# Patient Record
Sex: MALE | Race: WHITE | NOT HISPANIC OR LATINO | Employment: OTHER | URBAN - METROPOLITAN AREA
[De-identification: names, ages, dates, MRNs, and addresses within clinical notes are randomized per-mention and may not be internally consistent; named-entity substitution may affect disease eponyms.]

---

## 2023-02-28 ENCOUNTER — OFFICE VISIT (OUTPATIENT)
Dept: FAMILY MEDICINE | Facility: CLINIC | Age: 53
End: 2023-02-28
Payer: MEDICARE

## 2023-02-28 VITALS
RESPIRATION RATE: 18 BRPM | TEMPERATURE: 98 F | OXYGEN SATURATION: 97 % | SYSTOLIC BLOOD PRESSURE: 150 MMHG | HEART RATE: 89 BPM | DIASTOLIC BLOOD PRESSURE: 100 MMHG | WEIGHT: 286.25 LBS

## 2023-02-28 DIAGNOSIS — M54.9 BACK PAIN, UNSPECIFIED BACK LOCATION, UNSPECIFIED BACK PAIN LATERALITY, UNSPECIFIED CHRONICITY: ICD-10-CM

## 2023-02-28 DIAGNOSIS — M10.9 GOUT, UNSPECIFIED CAUSE, UNSPECIFIED CHRONICITY, UNSPECIFIED SITE: ICD-10-CM

## 2023-02-28 DIAGNOSIS — E55.9 VITAMIN D DEFICIENCY: ICD-10-CM

## 2023-02-28 DIAGNOSIS — Z00.00 WELLNESS EXAMINATION: ICD-10-CM

## 2023-02-28 DIAGNOSIS — Z12.5 SCREENING FOR PROSTATE CANCER: ICD-10-CM

## 2023-02-28 DIAGNOSIS — E29.1 HYPOGONADISM IN MALE: ICD-10-CM

## 2023-02-28 DIAGNOSIS — I10 HYPERTENSION, UNSPECIFIED TYPE: Primary | ICD-10-CM

## 2023-02-28 PROCEDURE — 99203 OFFICE O/P NEW LOW 30 MIN: CPT | Mod: 25,S$GLB,, | Performed by: PHYSICIAN ASSISTANT

## 2023-02-28 PROCEDURE — 99203 PR OFFICE/OUTPT VISIT, NEW, LEVL III, 30-44 MIN: ICD-10-PCS | Mod: 25,S$GLB,, | Performed by: PHYSICIAN ASSISTANT

## 2023-02-28 PROCEDURE — 96372 PR INJECTION,THERAP/PROPH/DIAG2ST, IM OR SUBCUT: ICD-10-PCS | Mod: S$GLB,,, | Performed by: PHYSICIAN ASSISTANT

## 2023-02-28 PROCEDURE — 96372 THER/PROPH/DIAG INJ SC/IM: CPT | Mod: S$GLB,,, | Performed by: PHYSICIAN ASSISTANT

## 2023-02-28 RX ORDER — TESTOSTERONE CYPIONATE 200 MG/ML
200 INJECTION, SOLUTION INTRAMUSCULAR
Qty: 10 ML | Refills: 1 | Status: SHIPPED | OUTPATIENT
Start: 2023-02-28 | End: 2024-02-22 | Stop reason: SDUPTHER

## 2023-02-28 RX ORDER — KETOROLAC TROMETHAMINE 30 MG/ML
60 INJECTION, SOLUTION INTRAMUSCULAR; INTRAVENOUS ONCE
Status: COMPLETED | OUTPATIENT
Start: 2023-02-28 | End: 2023-02-28

## 2023-02-28 RX ORDER — DULOXETIN HYDROCHLORIDE 30 MG/1
30 CAPSULE, DELAYED RELEASE ORAL DAILY
Qty: 90 CAPSULE | Refills: 1 | Status: SHIPPED | OUTPATIENT
Start: 2023-02-28 | End: 2023-06-19

## 2023-02-28 RX ORDER — KETOROLAC TROMETHAMINE 30 MG/ML
60 INJECTION, SOLUTION INTRAMUSCULAR; INTRAVENOUS
Status: DISCONTINUED | OUTPATIENT
Start: 2023-02-28 | End: 2023-02-28

## 2023-02-28 RX ORDER — LISINOPRIL 20 MG/1
20 TABLET ORAL DAILY
Qty: 90 TABLET | Refills: 3 | Status: SHIPPED | OUTPATIENT
Start: 2023-02-28 | End: 2023-06-19 | Stop reason: SDUPTHER

## 2023-02-28 RX ADMIN — KETOROLAC TROMETHAMINE 60 MG: 30 INJECTION, SOLUTION INTRAMUSCULAR; INTRAVENOUS at 03:02

## 2023-02-28 NOTE — PROGRESS NOTES
Subjective:       Patient ID: Bryant Corona is a 52 y.o. male.    Chief Complaint: Establish Care    Patient is a 53 yo male who is here to establish care today. He is a slight difficult historian.     Patient Active Problem List:     Hypertension uncontrolled     Hypogonadism in male has been out of his medication     Back pain request narcotic refill that he was getting from Dr Rojas      Vitamin D deficiency     Gout       Review of Systems   Constitutional:  Negative for activity change, chills, fatigue and fever.   HENT: Negative.     Eyes: Negative.    Respiratory:  Negative for chest tightness and shortness of breath.    Cardiovascular:  Negative for chest pain, palpitations and leg swelling.   Gastrointestinal:  Negative for abdominal pain.   Integumentary:  Negative.       Objective:      Physical Exam  Vitals reviewed.   Constitutional:       General: He is not in acute distress.     Appearance: Normal appearance. He is obese. He is not ill-appearing, toxic-appearing or diaphoretic.   Eyes:      Conjunctiva/sclera: Conjunctivae normal.   Cardiovascular:      Rate and Rhythm: Normal rate and regular rhythm.      Pulses: Normal pulses.      Heart sounds: Normal heart sounds. No murmur heard.    No friction rub. No gallop.   Pulmonary:      Effort: Pulmonary effort is normal. No respiratory distress.      Breath sounds: Normal breath sounds. No stridor. No wheezing, rhonchi or rales.   Chest:      Chest wall: No tenderness.   Abdominal:      General: There is distension.      Tenderness: There is no abdominal tenderness.   Musculoskeletal:         General: No swelling or tenderness.   Skin:     General: Skin is warm and dry.      Coloration: Skin is not jaundiced.   Neurological:      Mental Status: He is alert.   Psychiatric:         Mood and Affect: Mood normal.         Behavior: Behavior normal.         Thought Content: Thought content normal.         Judgment: Judgment normal.       Assessment:        Problem List Items Addressed This Visit       Vitamin D deficiency    Relevant Orders    Calcitriol    Hypogonadism in male    Relevant Orders    TESTOSTERONE    Hypertension - Primary    Relevant Orders    Ambulatory referral/consult to Pain Clinic    Comprehensive Metabolic Panel    TSH    T4, Free    CBC Auto Differential    Lipid Panel    Gout    Relevant Orders    Uric Acid    Back pain    Relevant Medications    ketorolac injection 60 mg     Other Visit Diagnoses       Wellness examination        Relevant Orders    HIV 1/2 Ag/Ab (4th Gen)    Hepatitis C Antibody    Screening for prostate cancer        Relevant Orders    PSA, Screening              Plan:       Hypertension, unspecified type  -     Ambulatory referral/consult to Pain Clinic; Future; Expected date: 03/07/2023  -     Comprehensive Metabolic Panel; Future; Expected date: 02/28/2023  -     TSH; Future; Expected date: 02/28/2023  -     T4, Free; Future; Expected date: 02/28/2023  -     CBC Auto Differential; Future; Expected date: 02/28/2023  -     Lipid Panel; Future; Expected date: 02/28/2023    Wellness examination  -     HIV 1/2 Ag/Ab (4th Gen); Future; Expected date: 02/28/2023  -     Hepatitis C Antibody; Future; Expected date: 02/28/2023    Hypogonadism in male  -     TESTOSTERONE; Future; Expected date: 02/28/2023    Screening for prostate cancer  -     PSA, Screening; Future; Expected date: 02/28/2023    Back pain, unspecified back location, unspecified back pain laterality, unspecified chronicity  -     Discontinue: ketorolac injection 60 mg    Gout, unspecified cause, unspecified chronicity, unspecified site  -     Uric Acid; Future; Expected date: 02/28/2023    Vitamin D deficiency  -     Calcitriol; Future; Expected date: 02/28/2023    Other orders  -     lisinopriL (PRINIVIL,ZESTRIL) 20 MG tablet; Take 1 tablet (20 mg total) by mouth once daily.  Dispense: 90 tablet; Refill: 3  -     DULoxetine (CYMBALTA) 30 MG capsule; Take 1 capsule  (30 mg total) by mouth once daily.  Dispense: 90 capsule; Refill: 1  -     testosterone cypionate (DEPO-TESTOSTERONE) 200 mg/mL injection; Inject 1 mL (200 mg total) into the muscle every 14 (fourteen) days.  Dispense: 10 mL; Refill: 1  -     ketorolac injection 60 mg           I spent 30 minutes on this encounter, time includes face-to-face, chart review, documentation, test review and orders.

## 2023-03-01 ENCOUNTER — LAB VISIT (OUTPATIENT)
Dept: FAMILY MEDICINE | Facility: CLINIC | Age: 53
End: 2023-03-01
Payer: MEDICARE

## 2023-03-01 DIAGNOSIS — Z00.00 WELLNESS EXAMINATION: ICD-10-CM

## 2023-03-01 DIAGNOSIS — E29.1 HYPOGONADISM IN MALE: ICD-10-CM

## 2023-03-01 DIAGNOSIS — Z12.5 SCREENING FOR PROSTATE CANCER: ICD-10-CM

## 2023-03-01 DIAGNOSIS — M10.9 GOUT, UNSPECIFIED CAUSE, UNSPECIFIED CHRONICITY, UNSPECIFIED SITE: ICD-10-CM

## 2023-03-01 DIAGNOSIS — I10 HYPERTENSION, UNSPECIFIED TYPE: ICD-10-CM

## 2023-03-01 LAB
ALBUMIN SERPL BCP-MCNC: 3.7 G/DL (ref 3.5–5.2)
ALP SERPL-CCNC: 83 U/L (ref 55–135)
ALT SERPL W/O P-5'-P-CCNC: 31 U/L (ref 10–44)
ANION GAP SERPL CALC-SCNC: 6 MMOL/L (ref 8–16)
AST SERPL-CCNC: 27 U/L (ref 10–40)
BASOPHILS # BLD AUTO: 0.06 K/UL (ref 0–0.2)
BASOPHILS NFR BLD: 0.6 % (ref 0–1.9)
BILIRUB SERPL-MCNC: 0.6 MG/DL (ref 0.1–1)
BUN SERPL-MCNC: 11 MG/DL (ref 6–20)
CALCIUM SERPL-MCNC: 9.2 MG/DL (ref 8.7–10.5)
CHLORIDE SERPL-SCNC: 105 MMOL/L (ref 95–110)
CHOLEST SERPL-MCNC: 184 MG/DL (ref 120–199)
CHOLEST/HDLC SERPL: 5.3 {RATIO} (ref 2–5)
CO2 SERPL-SCNC: 28 MMOL/L (ref 23–29)
CREAT SERPL-MCNC: 0.9 MG/DL (ref 0.5–1.4)
DIFFERENTIAL METHOD: ABNORMAL
EOSINOPHIL # BLD AUTO: 0.4 K/UL (ref 0–0.5)
EOSINOPHIL NFR BLD: 3.9 % (ref 0–8)
ERYTHROCYTE [DISTWIDTH] IN BLOOD BY AUTOMATED COUNT: 13.5 % (ref 11.5–14.5)
EST. GFR  (NO RACE VARIABLE): >60 ML/MIN/1.73 M^2
GLUCOSE SERPL-MCNC: 101 MG/DL (ref 70–110)
HCT VFR BLD AUTO: 48.5 % (ref 40–54)
HDLC SERPL-MCNC: 35 MG/DL (ref 40–75)
HDLC SERPL: 19 % (ref 20–50)
HGB BLD-MCNC: 16.1 G/DL (ref 14–18)
IMM GRANULOCYTES # BLD AUTO: 0.05 K/UL (ref 0–0.04)
IMM GRANULOCYTES NFR BLD AUTO: 0.5 % (ref 0–0.5)
LDLC SERPL CALC-MCNC: 116.4 MG/DL (ref 63–159)
LYMPHOCYTES # BLD AUTO: 2.9 K/UL (ref 1–4.8)
LYMPHOCYTES NFR BLD: 29.9 % (ref 18–48)
MCH RBC QN AUTO: 30.7 PG (ref 27–31)
MCHC RBC AUTO-ENTMCNC: 33.2 G/DL (ref 32–36)
MCV RBC AUTO: 92 FL (ref 82–98)
MONOCYTES # BLD AUTO: 0.6 K/UL (ref 0.3–1)
MONOCYTES NFR BLD: 6 % (ref 4–15)
NEUTROPHILS # BLD AUTO: 5.8 K/UL (ref 1.8–7.7)
NEUTROPHILS NFR BLD: 59.1 % (ref 38–73)
NONHDLC SERPL-MCNC: 149 MG/DL
NRBC BLD-RTO: 0 /100 WBC
PLATELET # BLD AUTO: 305 K/UL (ref 150–450)
PMV BLD AUTO: 9.8 FL (ref 9.2–12.9)
POTASSIUM SERPL-SCNC: 4.5 MMOL/L (ref 3.5–5.1)
PROT SERPL-MCNC: 6.5 G/DL (ref 6–8.4)
RBC # BLD AUTO: 5.25 M/UL (ref 4.6–6.2)
SODIUM SERPL-SCNC: 139 MMOL/L (ref 136–145)
TRIGL SERPL-MCNC: 163 MG/DL (ref 30–150)
URATE SERPL-MCNC: 8.9 MG/DL (ref 3.4–7)
WBC # BLD AUTO: 9.79 K/UL (ref 3.9–12.7)

## 2023-03-01 PROCEDURE — 84443 ASSAY THYROID STIM HORMONE: CPT | Performed by: PHYSICIAN ASSISTANT

## 2023-03-01 PROCEDURE — 36415 COLL VENOUS BLD VENIPUNCTURE: CPT | Performed by: PHYSICIAN ASSISTANT

## 2023-03-01 PROCEDURE — 85025 COMPLETE CBC W/AUTO DIFF WBC: CPT | Performed by: PHYSICIAN ASSISTANT

## 2023-03-01 PROCEDURE — 80053 COMPREHEN METABOLIC PANEL: CPT | Performed by: PHYSICIAN ASSISTANT

## 2023-03-01 PROCEDURE — 87389 HIV-1 AG W/HIV-1&-2 AB AG IA: CPT | Performed by: PHYSICIAN ASSISTANT

## 2023-03-01 PROCEDURE — 86803 HEPATITIS C AB TEST: CPT | Performed by: PHYSICIAN ASSISTANT

## 2023-03-01 PROCEDURE — 84403 ASSAY OF TOTAL TESTOSTERONE: CPT | Performed by: PHYSICIAN ASSISTANT

## 2023-03-01 PROCEDURE — 84153 ASSAY OF PSA TOTAL: CPT | Performed by: PHYSICIAN ASSISTANT

## 2023-03-01 PROCEDURE — 84439 ASSAY OF FREE THYROXINE: CPT | Performed by: PHYSICIAN ASSISTANT

## 2023-03-01 PROCEDURE — 80061 LIPID PANEL: CPT | Performed by: PHYSICIAN ASSISTANT

## 2023-03-01 PROCEDURE — 84550 ASSAY OF BLOOD/URIC ACID: CPT | Performed by: PHYSICIAN ASSISTANT

## 2023-03-02 LAB
COMPLEXED PSA SERPL-MCNC: 2.5 NG/ML (ref 0–4)
HCV AB SERPL QL IA: NORMAL
HIV 1+2 AB+HIV1 P24 AG SERPL QL IA: NORMAL
T4 FREE SERPL-MCNC: 0.79 NG/DL (ref 0.71–1.51)
TESTOST SERPL-MCNC: 299 NG/DL (ref 304–1227)
TSH SERPL DL<=0.005 MIU/L-ACNC: 1.61 UIU/ML (ref 0.4–4)

## 2023-06-08 ENCOUNTER — TELEPHONE (OUTPATIENT)
Dept: PRIMARY CARE CLINIC | Facility: CLINIC | Age: 53
End: 2023-06-08

## 2023-06-08 RX ORDER — INDOMETHACIN 50 MG/1
50 CAPSULE ORAL 3 TIMES DAILY PRN
Qty: 20 CAPSULE | Refills: 0 | Status: SHIPPED | OUTPATIENT
Start: 2023-06-08 | End: 2023-06-13 | Stop reason: SDUPTHER

## 2023-06-08 NOTE — TELEPHONE ENCOUNTER
----- Message from Paul Liz sent at 6/8/2023  9:32 AM CDT -----  Regarding: refill  Contact: EDMAR LEUNG [[id number]]  Type:  RX Refill Request    Who Called:  Mellissa, wife    Refill or New Rx:  refill    RX Name and Strength:  Allopurinol    How is the patient currently taking it? (ex. 1XDay):  See chart    Is this a 30 day or 90 day RX:  see chart    Preferred Pharmacy with phone number:    [facility]/pharmacy #[id number] [address] [address]  Phone: [phone] Fax: [phone]      Local or Mail Order:  Local    Ordering Provider:  OTTONIEL Graham    [facility] Call Back Number:  [phone]    Additional Information:  Did not see Rx on chart. Please call to advise.

## 2023-06-13 ENCOUNTER — TELEPHONE (OUTPATIENT)
Dept: PRIMARY CARE CLINIC | Facility: CLINIC | Age: 53
End: 2023-06-13
Payer: MEDICARE

## 2023-06-13 RX ORDER — INDOMETHACIN 50 MG/1
50 CAPSULE ORAL 3 TIMES DAILY PRN
Qty: 30 CAPSULE | Refills: 1 | Status: SHIPPED | OUTPATIENT
Start: 2023-06-13

## 2023-06-13 NOTE — TELEPHONE ENCOUNTER
Patient states he is having a bad gout flare up and could not even go to work.     He has an appt for Monday but is asking if something could be sent in to [facility] in the meantime.

## 2023-06-13 NOTE — TELEPHONE ENCOUNTER
----- Message from Johanna Evans sent at 6/13/2023  2:26 PM CDT -----  Regarding: Sooner Appointment Request  Contact: EDMAR LEUNG [phone]  Type:  Sooner Appointment Request      Patient is requesting a sooner appointment.  Patient declined first available appointment listed below.  Patient will not accept being placed on the waitlist and is requesting a message be sent to doctor.      Name of Caller:  EDMAR LEUNG    When is the first available appointment? 06-20-23    Symptoms:  Gout (R) Hand / Swollen     Best Call Back Number:  [phone] (home)       Additional Information:  Patient is calling office to speak with nurse/MA to schedule sooner appointment. Patient complains of severe pain.  Please call back and advise. Thanks

## 2023-06-19 ENCOUNTER — OFFICE VISIT (OUTPATIENT)
Dept: PRIMARY CARE CLINIC | Facility: CLINIC | Age: 53
End: 2023-06-19
Payer: MEDICARE

## 2023-06-19 VITALS
HEART RATE: 98 BPM | WEIGHT: 274.69 LBS | DIASTOLIC BLOOD PRESSURE: 98 MMHG | OXYGEN SATURATION: 97 % | SYSTOLIC BLOOD PRESSURE: 146 MMHG

## 2023-06-19 DIAGNOSIS — M10.9 GOUT, UNSPECIFIED CAUSE, UNSPECIFIED CHRONICITY, UNSPECIFIED SITE: ICD-10-CM

## 2023-06-19 DIAGNOSIS — I10 HYPERTENSION, UNSPECIFIED TYPE: Primary | ICD-10-CM

## 2023-06-19 PROCEDURE — 99214 OFFICE O/P EST MOD 30 MIN: CPT | Mod: S$GLB,,, | Performed by: PHYSICIAN ASSISTANT

## 2023-06-19 PROCEDURE — 99214 PR OFFICE/OUTPT VISIT, EST, LEVL IV, 30-39 MIN: ICD-10-PCS | Mod: S$GLB,,, | Performed by: PHYSICIAN ASSISTANT

## 2023-06-19 RX ORDER — FLUOXETINE HYDROCHLORIDE 40 MG/1
40 CAPSULE ORAL DAILY
Qty: 90 CAPSULE | Refills: 3 | Status: SHIPPED | OUTPATIENT
Start: 2023-06-19 | End: 2024-02-22 | Stop reason: SDUPTHER

## 2023-06-19 RX ORDER — LISINOPRIL 20 MG/1
20 TABLET ORAL DAILY
Qty: 90 TABLET | Refills: 3 | Status: SHIPPED | OUTPATIENT
Start: 2023-06-19 | End: 2023-10-16

## 2023-06-19 RX ORDER — ALLOPURINOL 300 MG/1
300 TABLET ORAL DAILY
Qty: 90 TABLET | Refills: 3 | Status: SHIPPED | OUTPATIENT
Start: 2023-06-19 | End: 2024-02-26 | Stop reason: SDUPTHER

## 2023-06-19 RX ORDER — FLUOXETINE HYDROCHLORIDE 40 MG/1
40 CAPSULE ORAL DAILY
COMMUNITY
End: 2023-06-19 | Stop reason: SDUPTHER

## 2023-06-19 NOTE — PROGRESS NOTES
Subjective     Patient ID: Bryant Corona is a 52 y.o. male.    Chief Complaint: Gout and Joint Pain (Fluid on elbow)    Elbow Injury  This is a new problem. The current episode started 1 to 4 weeks ago. The problem occurs constantly. The problem has been gradually improving. Associated symptoms include arthralgias, a fever and joint swelling. Pertinent negatives include no abdominal pain, chest pain, chills, fatigue or nausea. Associated symptoms comments: Red tender right elbow. . Nothing aggravates the symptoms. Treatments tried: was recently given indomethasin. The treatment provided moderate relief.   Review of Systems   Constitutional:  Positive for fever. Negative for activity change, chills and fatigue.   Respiratory:  Negative for chest tightness, shortness of breath and wheezing.    Cardiovascular:  Negative for chest pain, palpitations and leg swelling.   Gastrointestinal:  Negative for abdominal pain, diarrhea and nausea.   Musculoskeletal:  Positive for arthralgias and joint swelling.   Past Medical History:   Diagnosis Date    Anxiety     Back pain     Depression     Hypertension           Objective     Physical Exam  Vitals reviewed.   Constitutional:       General: He is not in acute distress.     Appearance: Normal appearance. He is not ill-appearing, toxic-appearing or diaphoretic.   Cardiovascular:      Rate and Rhythm: Normal rate and regular rhythm.      Pulses: Normal pulses.      Heart sounds: Normal heart sounds. No murmur heard.    No friction rub. No gallop.   Pulmonary:      Effort: Pulmonary effort is normal. No respiratory distress.      Breath sounds: Normal breath sounds. No stridor. No wheezing, rhonchi or rales.   Chest:      Chest wall: No tenderness.   Abdominal:      Palpations: Abdomen is soft.      Tenderness: There is no abdominal tenderness.   Musculoskeletal:      Right elbow: Swelling present. Tenderness present in olecranon process.   Neurological:      Mental Status: He is  alert.          Assessment and Plan     1. Hypertension, unspecified type    2. Gout, unspecified cause, unspecified chronicity, unspecified site    Other orders  -     lisinopriL (PRINIVIL,ZESTRIL) 20 MG tablet; Take 1 tablet (20 mg total) by mouth once daily.  Dispense: 90 tablet; Refill: 3  -     FLUoxetine 40 MG capsule; Take 1 capsule (40 mg total) by mouth once daily.  Dispense: 90 capsule; Refill: 3  -     allopurinoL (ZYLOPRIM) 300 MG tablet; Take 1 tablet (300 mg total) by mouth once daily.  Dispense: 90 tablet; Refill: 3        I spent 30 minutes on this encounter, time includes face-to-face, chart review, documentation, test review and orders.           Follow up in about 4 weeks (around 7/17/2023).

## 2023-09-26 ENCOUNTER — TELEPHONE (OUTPATIENT)
Dept: PRIMARY CARE CLINIC | Facility: CLINIC | Age: 53
End: 2023-09-26
Payer: MEDICARE

## 2023-09-26 NOTE — TELEPHONE ENCOUNTER
Pt called stating he was having chest pains upon returning pt call he then stated that he was depressed and feeling anxious. Reports taking Prozac. States that he has been in the bed for several days and doesn't have any energy to do anything. Pt denies having a fever, body aches, pain but states that he has had a few nights of night sweats. Encouraged pt to go to the ER but pt refused and scheduled next opening in clinic with DENA Chester. educated pt on coming in and being evaluated and to have his medications reviewed to be sure that they were effective. Understanding voiced. Pt was very emotional during call. Also spoke with his wife who was also emotional. Will follow up with pt prior to scheduled appointment time

## 2023-10-16 ENCOUNTER — OFFICE VISIT (OUTPATIENT)
Dept: PRIMARY CARE CLINIC | Facility: CLINIC | Age: 53
End: 2023-10-16
Payer: MEDICARE

## 2023-10-16 VITALS
WEIGHT: 269.63 LBS | OXYGEN SATURATION: 99 % | HEART RATE: 83 BPM | SYSTOLIC BLOOD PRESSURE: 138 MMHG | BODY MASS INDEX: 37.75 KG/M2 | HEIGHT: 71 IN | DIASTOLIC BLOOD PRESSURE: 102 MMHG

## 2023-10-16 DIAGNOSIS — I15.2 OBESITY, DIABETES, AND HYPERTENSION SYNDROME: ICD-10-CM

## 2023-10-16 DIAGNOSIS — E11.69 OBESITY, DIABETES, AND HYPERTENSION SYNDROME: ICD-10-CM

## 2023-10-16 DIAGNOSIS — G47.30 SLEEP APNEA, UNSPECIFIED TYPE: ICD-10-CM

## 2023-10-16 DIAGNOSIS — E66.9 OBESITY, DIABETES, AND HYPERTENSION SYNDROME: ICD-10-CM

## 2023-10-16 DIAGNOSIS — I10 HYPERTENSION, UNSPECIFIED TYPE: Primary | ICD-10-CM

## 2023-10-16 DIAGNOSIS — F19.11 HISTORY OF SUBSTANCE ABUSE: ICD-10-CM

## 2023-10-16 DIAGNOSIS — E66.01 SEVERE OBESITY (BMI 35.0-39.9) WITH COMORBIDITY: ICD-10-CM

## 2023-10-16 DIAGNOSIS — R53.83 FATIGUE, UNSPECIFIED TYPE: ICD-10-CM

## 2023-10-16 DIAGNOSIS — E11.59 OBESITY, DIABETES, AND HYPERTENSION SYNDROME: ICD-10-CM

## 2023-10-16 DIAGNOSIS — R73.03 PRE-DIABETES: ICD-10-CM

## 2023-10-16 DIAGNOSIS — E29.1 HYPOGONADISM IN MALE: ICD-10-CM

## 2023-10-16 DIAGNOSIS — E78.00 ELEVATED CHOLESTEROL: ICD-10-CM

## 2023-10-16 LAB
ALBUMIN SERPL BCP-MCNC: 3.8 G/DL (ref 3.5–5.2)
ALP SERPL-CCNC: 81 U/L (ref 55–135)
ALT SERPL W/O P-5'-P-CCNC: 13 U/L (ref 10–44)
ANION GAP SERPL CALC-SCNC: 10 MMOL/L (ref 8–16)
AST SERPL-CCNC: 11 U/L (ref 10–40)
BILIRUB SERPL-MCNC: 0.8 MG/DL (ref 0.1–1)
BUN SERPL-MCNC: 15 MG/DL (ref 6–20)
CALCIUM SERPL-MCNC: 9.5 MG/DL (ref 8.7–10.5)
CHLORIDE SERPL-SCNC: 105 MMOL/L (ref 95–110)
CHOLEST SERPL-MCNC: 184 MG/DL (ref 120–199)
CHOLEST/HDLC SERPL: 6.1 {RATIO} (ref 2–5)
CO2 SERPL-SCNC: 23 MMOL/L (ref 23–29)
CREAT SERPL-MCNC: 1.2 MG/DL (ref 0.5–1.4)
EST. GFR  (NO RACE VARIABLE): >60 ML/MIN/1.73 M^2
ESTIMATED AVG GLUCOSE: 120 MG/DL (ref 68–131)
GLUCOSE SERPL-MCNC: 93 MG/DL (ref 70–110)
HBA1C MFR BLD: 5.8 % (ref 4–5.6)
HDLC SERPL-MCNC: 30 MG/DL (ref 40–75)
HDLC SERPL: 16.3 % (ref 20–50)
LDLC SERPL CALC-MCNC: 119.2 MG/DL (ref 63–159)
NONHDLC SERPL-MCNC: 154 MG/DL
POTASSIUM SERPL-SCNC: 4.6 MMOL/L (ref 3.5–5.1)
PROT SERPL-MCNC: 7.1 G/DL (ref 6–8.4)
SODIUM SERPL-SCNC: 138 MMOL/L (ref 136–145)
T3FREE SERPL-MCNC: 2.5 PG/ML (ref 2.3–4.2)
T4 FREE SERPL-MCNC: 0.94 NG/DL (ref 0.71–1.51)
TESTOST SERPL-MCNC: 307 NG/DL (ref 304–1227)
TRIGL SERPL-MCNC: 174 MG/DL (ref 30–150)
TSH SERPL DL<=0.005 MIU/L-ACNC: 1.57 UIU/ML (ref 0.4–4)

## 2023-10-16 PROCEDURE — 99214 PR OFFICE/OUTPT VISIT, EST, LEVL IV, 30-39 MIN: ICD-10-PCS | Mod: S$GLB,,, | Performed by: PHYSICIAN ASSISTANT

## 2023-10-16 PROCEDURE — 84403 ASSAY OF TOTAL TESTOSTERONE: CPT | Performed by: PHYSICIAN ASSISTANT

## 2023-10-16 PROCEDURE — 84443 ASSAY THYROID STIM HORMONE: CPT | Performed by: PHYSICIAN ASSISTANT

## 2023-10-16 PROCEDURE — 83036 HEMOGLOBIN GLYCOSYLATED A1C: CPT | Performed by: PHYSICIAN ASSISTANT

## 2023-10-16 PROCEDURE — 80061 LIPID PANEL: CPT | Performed by: PHYSICIAN ASSISTANT

## 2023-10-16 PROCEDURE — 84481 FREE ASSAY (FT-3): CPT | Performed by: PHYSICIAN ASSISTANT

## 2023-10-16 PROCEDURE — 84439 ASSAY OF FREE THYROXINE: CPT | Performed by: PHYSICIAN ASSISTANT

## 2023-10-16 PROCEDURE — 99214 OFFICE O/P EST MOD 30 MIN: CPT | Mod: S$GLB,,, | Performed by: PHYSICIAN ASSISTANT

## 2023-10-16 PROCEDURE — 80053 COMPREHEN METABOLIC PANEL: CPT | Performed by: PHYSICIAN ASSISTANT

## 2023-10-16 RX ORDER — LISINOPRIL 40 MG/1
40 TABLET ORAL DAILY
Qty: 90 TABLET | Refills: 3 | Status: SHIPPED | OUTPATIENT
Start: 2023-10-16 | End: 2023-11-29 | Stop reason: CLARIF

## 2023-10-16 NOTE — PROGRESS NOTES
Subjective     Patient ID: Bryant Corona is a 53 y.o. male.    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 He was let go from the job. This happened 1 month ago. He sleeps all day, He feels like he is going to pass out when he tries to do even minimal activity.     Fatigue  This is a recurrent problem. The current episode started more than 1 month ago. The problem occurs constantly. The problem has been unchanged. Associated symptoms include fatigue and weakness. Pertinent negatives include no abdominal pain, chest pain, chills, coughing or headaches. Exacerbated by: uncontroled sleep apnea. He has tried nothing for the symptoms.     Patient Active Problem List   Diagnosis    Hypertension    Hypogonadism in male    Back pain    Vitamin D deficiency    Gout    Sleep apnea    Obesity, diabetes, and hypertension syndrome    Elevated cholesterol    Pre-diabetes    Severe obesity (BMI 35.0-39.9) with comorbidity    History of substance abuse       Review of Systems   Constitutional:  Positive for activity change and fatigue. Negative for chills.   HENT: Negative.     Eyes: Negative.    Respiratory:  Positive for chest tightness and shortness of breath. Negative for apnea, cough, choking, wheezing and stridor.    Cardiovascular:  Negative for chest pain.   Gastrointestinal:  Negative for abdominal pain.   Endocrine: Negative.    Genitourinary: Negative.    Musculoskeletal:  Positive for back pain.   Integumentary:  Negative.   Neurological:  Positive for weakness. Negative for dizziness, seizures, syncope, light-headedness and headaches.   Psychiatric/Behavioral:  Positive for dysphoric mood. Negative for suicidal ideas. The patient is not nervous/anxious.           Objective     Physical Exam  Vitals reviewed.   Constitutional:       General: He is not in acute distress.     Appearance: Normal  appearance. He is obese. He is not ill-appearing, toxic-appearing or diaphoretic.   Neck:      Vascular: No carotid bruit.   Cardiovascular:      Rate and Rhythm: Normal rate and regular rhythm.      Pulses: Normal pulses.      Heart sounds: Normal heart sounds. No murmur heard.     No friction rub. No gallop.   Pulmonary:      Effort: Pulmonary effort is normal. No respiratory distress.      Breath sounds: Normal breath sounds. No stridor. No wheezing, rhonchi or rales.   Chest:      Chest wall: No tenderness.   Abdominal:      General: There is distension.      Tenderness: There is no abdominal tenderness.   Musculoskeletal:         General: No swelling or tenderness.      Cervical back: No rigidity or tenderness.   Lymphadenopathy:      Cervical: No cervical adenopathy.   Neurological:      Mental Status: He is alert.            Assessment and Plan     1. Hypertension, unspecified type  -  increase to    lisinopriL (PRINIVIL,ZESTRIL) 40 MG tablet; Take 1 tablet (40 mg total) by mouth once daily.  Dispense: 90 tablet; Refill: 3  -     Comprehensive Metabolic Panel; Future; Expected date: 10/16/2023    2. Hypogonadism in male  -     TESTOSTERONE; Future; Expected date: 10/16/2023    3. Pre-diabetes  -     Hemoglobin A1C; Future; Expected date: 10/16/2023  -     Ambulatory referral/consult to Diabetes Education; Future; Expected date: 10/23/2023    4. Fatigue, unspecified type  -     TSH; Future; Expected date: 10/16/2023  -     T3, Free; Future; Expected date: 10/16/2023  -     T4, Free; Future; Expected date: 10/16/2023  -     Ambulatory referral/consult to Cardiology; Future; Expected date: 10/23/2023 to r/o cardiac cause    5. Elevated cholesterol  -     Lipid Panel; Future; Expected date: 10/16/2023    6. Obesity, diabetes, and hypertension syndrome  -     Ambulatory referral/consult to Diabetes Education; Future; Expected date: 10/23/2023    7. Sleep apnea, unspecified type  Patient has not been using his  machine. He will start using it.     8. Severe obesity (BMI 35.0-39.9) with comorbidity  The patient is asked to make an attempt to improve diet and exercise patterns to aid in medical management of this problem.      9. History of substance abuse  Has been in remission                  Follow up in about 4 weeks (around 11/13/2023) for with me.

## 2023-10-17 ENCOUNTER — TELEPHONE (OUTPATIENT)
Dept: PRIMARY CARE CLINIC | Facility: CLINIC | Age: 53
End: 2023-10-17
Payer: MEDICARE

## 2023-10-17 NOTE — TELEPHONE ENCOUNTER
----- Message from William Graham III, PA-C sent at 10/17/2023  7:19 AM CDT -----  Bryant Corona    The lab shows that you are in the prediabetic range. I recommend a diet low in concentrated sweets and weight loss needed.

## 2023-10-18 ENCOUNTER — OFFICE VISIT (OUTPATIENT)
Dept: CARDIOLOGY | Facility: CLINIC | Age: 53
End: 2023-10-18
Payer: MEDICARE

## 2023-10-18 VITALS
WEIGHT: 272.5 LBS | HEIGHT: 71 IN | BODY MASS INDEX: 38.15 KG/M2 | DIASTOLIC BLOOD PRESSURE: 78 MMHG | HEART RATE: 78 BPM | SYSTOLIC BLOOD PRESSURE: 128 MMHG

## 2023-10-18 DIAGNOSIS — R94.31 NONSPECIFIC ABNORMAL ELECTROCARDIOGRAM (ECG) (EKG): ICD-10-CM

## 2023-10-18 DIAGNOSIS — E66.01 SEVERE OBESITY (BMI 35.0-39.9) WITH COMORBIDITY: Chronic | ICD-10-CM

## 2023-10-18 DIAGNOSIS — E66.9 OBESITY, DIABETES, AND HYPERTENSION SYNDROME: Chronic | ICD-10-CM

## 2023-10-18 DIAGNOSIS — R06.02 SOB (SHORTNESS OF BREATH): Primary | ICD-10-CM

## 2023-10-18 DIAGNOSIS — R07.2 PRECORDIAL PAIN: Chronic | ICD-10-CM

## 2023-10-18 DIAGNOSIS — E11.69 OBESITY, DIABETES, AND HYPERTENSION SYNDROME: Chronic | ICD-10-CM

## 2023-10-18 DIAGNOSIS — I15.2 OBESITY, DIABETES, AND HYPERTENSION SYNDROME: Chronic | ICD-10-CM

## 2023-10-18 DIAGNOSIS — E11.59 OBESITY, DIABETES, AND HYPERTENSION SYNDROME: Chronic | ICD-10-CM

## 2023-10-18 DIAGNOSIS — Z72.0 TOBACCO DIPPER: Chronic | ICD-10-CM

## 2023-10-18 DIAGNOSIS — R53.83 FATIGUE, UNSPECIFIED TYPE: Chronic | ICD-10-CM

## 2023-10-18 DIAGNOSIS — G47.33 OSA ON CPAP: Chronic | ICD-10-CM

## 2023-10-18 DIAGNOSIS — R55 NEAR SYNCOPE: ICD-10-CM

## 2023-10-18 DIAGNOSIS — E78.00 ELEVATED CHOLESTEROL: Chronic | ICD-10-CM

## 2023-10-18 PROCEDURE — 93000 ELECTROCARDIOGRAM COMPLETE: CPT | Mod: ,,, | Performed by: INTERNAL MEDICINE

## 2023-10-18 PROCEDURE — 99204 OFFICE O/P NEW MOD 45 MIN: CPT | Mod: 25,S$GLB,, | Performed by: INTERNAL MEDICINE

## 2023-10-18 PROCEDURE — 93000 EKG 12-LEAD: ICD-10-PCS | Mod: ,,, | Performed by: INTERNAL MEDICINE

## 2023-10-18 PROCEDURE — 99204 PR OFFICE/OUTPT VISIT, NEW, LEVL IV, 45-59 MIN: ICD-10-PCS | Mod: 25,S$GLB,, | Performed by: INTERNAL MEDICINE

## 2023-10-18 RX ORDER — PRAVASTATIN SODIUM 20 MG/1
20 TABLET ORAL DAILY
Qty: 90 TABLET | Refills: 0 | Status: SHIPPED | OUTPATIENT
Start: 2023-10-18 | End: 2024-01-18
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
## 2023-10-25 DIAGNOSIS — E11.9 TYPE 2 DIABETES MELLITUS WITHOUT COMPLICATION: ICD-10-CM

## 2023-11-02 ENCOUNTER — CLINICAL SUPPORT (OUTPATIENT)
Dept: CARDIOLOGY | Facility: CLINIC | Age: 53
End: 2023-11-02
Attending: INTERNAL MEDICINE
Payer: MEDICARE

## 2023-11-02 VITALS — WEIGHT: 272 LBS | HEIGHT: 71 IN | BODY MASS INDEX: 38.08 KG/M2

## 2023-11-02 DIAGNOSIS — R55 NEAR SYNCOPE: ICD-10-CM

## 2023-11-02 LAB
ASCENDING AORTA: 2.94 CM
AV INDEX (PROSTH): 0.82
AV MEAN GRADIENT: 3 MMHG
AV PEAK GRADIENT: 6 MMHG
AV VALVE AREA BY VELOCITY RATIO: 3.4 CM²
AV VALVE AREA: 3.32 CM²
AV VELOCITY RATIO: 0.84
BSA FOR ECHO PROCEDURE: 2.49 M2
CV ECHO LV RWT: 0.42 CM
DOP CALC AO PEAK VEL: 1.19 M/S
DOP CALC AO VTI: 23 CM
DOP CALC LVOT AREA: 4 CM2
DOP CALC LVOT DIAMETER: 2.27 CM
DOP CALC LVOT PEAK VEL: 1 M/S
DOP CALC LVOT STROKE VOLUME: 76.45 CM3
DOP CALCLVOT PEAK VEL VTI: 18.9 CM
E WAVE DECELERATION TIME: 127.27 MSEC
E/A RATIO: 0.9
E/E' RATIO: 9.78 M/S
ECHO LV POSTERIOR WALL: 1.09 CM (ref 0.6–1.1)
EJECTION FRACTION: 70 %
FRACTIONAL SHORTENING: 42 % (ref 28–44)
INTERVENTRICULAR SEPTUM: 1.25 CM (ref 0.6–1.1)
LEFT ARM DIASTOLIC BLOOD PRESSURE: 78 MMHG
LEFT ARM SYSTOLIC BLOOD PRESSURE: 128 MMHG
LEFT ATRIUM SIZE: 3.93 CM
LEFT ATRIUM VOLUME INDEX MOD: 35.2 ML/M2
LEFT ATRIUM VOLUME MOD: 84.59 CM3
LEFT CBA DIAS: 22 CM/S
LEFT CBA SYS: 65 CM/S
LEFT CCA DIST DIAS: 22 CM/S
LEFT CCA DIST SYS: 70 CM/S
LEFT CCA MID DIAS: 24 CM/S
LEFT CCA MID SYS: 79 CM/S
LEFT CCA PROX DIAS: 15 CM/S
LEFT CCA PROX SYS: 68 CM/S
LEFT ECA DIAS: 16 CM/S
LEFT ECA SYS: 107 CM/S
LEFT ICA DIST DIAS: 33 CM/S
LEFT ICA DIST SYS: 73 CM/S
LEFT ICA MID DIAS: 21 CM/S
LEFT ICA MID SYS: 60 CM/S
LEFT ICA PROX DIAS: 15 CM/S
LEFT ICA PROX SYS: 42 CM/S
LEFT INTERNAL DIMENSION IN SYSTOLE: 3.02 CM (ref 2.1–4)
LEFT VENTRICLE DIASTOLIC VOLUME INDEX: 53.66 ML/M2
LEFT VENTRICLE DIASTOLIC VOLUME: 128.78 ML
LEFT VENTRICLE MASS INDEX: 100 G/M2
LEFT VENTRICLE SYSTOLIC VOLUME INDEX: 14.8 ML/M2
LEFT VENTRICLE SYSTOLIC VOLUME: 35.62 ML
LEFT VENTRICULAR INTERNAL DIMENSION IN DIASTOLE: 5.19 CM (ref 3.5–6)
LEFT VENTRICULAR MASS: 239.52 G
LEFT VERTEBRAL DIAS: 21 CM/S
LEFT VERTEBRAL SYS: 46 CM/S
LV LATERAL E/E' RATIO: 8.8 M/S
LV SEPTAL E/E' RATIO: 11 M/S
LVOT MG: 2.02 MMHG
LVOT MV: 0.67 CM/S
MV PEAK A VEL: 0.98 M/S
MV PEAK E VEL: 0.88 M/S
MV STENOSIS PRESSURE HALF TIME: 36.91 MS
MV VALVE AREA P 1/2 METHOD: 5.96 CM2
OHS CV CAROTID RIGHT ICA EDV HIGHEST: 32
OHS CV CAROTID ULTRASOUND LEFT ICA/CCA RATIO: 1.04
OHS CV CAROTID ULTRASOUND RIGHT ICA/CCA RATIO: 0.78
OHS CV PV CAROTID LEFT HIGHEST CCA: 79
OHS CV PV CAROTID LEFT HIGHEST ICA: 73
OHS CV PV CAROTID RIGHT HIGHEST CCA: 93
OHS CV PV CAROTID RIGHT HIGHEST ICA: 73
OHS CV US CAROTID LEFT HIGHEST EDV: 33
PISA TR MAX VEL: 2.5 M/S
RA PRESSURE ESTIMATED: 3 MMHG
RIGHT ARM DIASTOLIC BLOOD PRESSURE: 78 MMHG
RIGHT ARM SYSTOLIC BLOOD PRESSURE: 128 MMHG
RIGHT CBA DIAS: 18 CM/S
RIGHT CBA SYS: 72 CM/S
RIGHT CCA DIST DIAS: 23 CM/S
RIGHT CCA DIST SYS: 93 CM/S
RIGHT CCA MID DIAS: 19 CM/S
RIGHT CCA MID SYS: 78 CM/S
RIGHT CCA PROX DIAS: 15 CM/S
RIGHT CCA PROX SYS: 78 CM/S
RIGHT ECA DIAS: 11 CM/S
RIGHT ECA SYS: 70 CM/S
RIGHT ICA DIST DIAS: 32 CM/S
RIGHT ICA DIST SYS: 73 CM/S
RIGHT ICA MID DIAS: 23 CM/S
RIGHT ICA MID SYS: 61 CM/S
RIGHT ICA PROX DIAS: 13 CM/S
RIGHT ICA PROX SYS: 38 CM/S
RIGHT VENTRICULAR END-DIASTOLIC DIMENSION: 3.81 CM
RIGHT VENTRICULAR LENGTH IN DIASTOLE (APICAL 4-CHAMBER VIEW): 6.93 CM
RIGHT VERTEBRAL DIAS: 19 CM/S
RIGHT VERTEBRAL SYS: 38 CM/S
RV MID DIAMA: 2.27 CM
RV TB RVSP: 6 MMHG
RV TISSUE DOPPLER FREE WALL SYSTOLIC VELOCITY 1 (APICAL 4 CHAMBER VIEW): 15.82 CM/S
SINUS: 3.22 CM
STJ: 3.23 CM
TDI LATERAL: 0.1 M/S
TDI SEPTAL: 0.08 M/S
TDI: 0.09 M/S
TR MAX PG: 25 MMHG
TRICUSPID ANNULAR PLANE SYSTOLIC EXCURSION: 2.29 CM
TV REST PULMONARY ARTERY PRESSURE: 28 MMHG
Z-SCORE OF LEFT VENTRICULAR DIMENSION IN END DIASTOLE: -7.45
Z-SCORE OF LEFT VENTRICULAR DIMENSION IN END SYSTOLE: -6.12

## 2023-11-02 PROCEDURE — 93880 EXTRACRANIAL BILAT STUDY: CPT | Mod: S$GLB,,, | Performed by: INTERNAL MEDICINE

## 2023-11-02 PROCEDURE — 93880 CV US DOPPLER CAROTID (CUPID ONLY): ICD-10-PCS | Mod: S$GLB,,, | Performed by: INTERNAL MEDICINE

## 2023-11-09 ENCOUNTER — CLINICAL SUPPORT (OUTPATIENT)
Dept: CARDIOLOGY | Facility: HOSPITAL | Age: 53
End: 2023-11-09
Attending: INTERNAL MEDICINE
Payer: MEDICARE

## 2023-11-09 ENCOUNTER — HOSPITAL ENCOUNTER (OUTPATIENT)
Dept: RADIOLOGY | Facility: HOSPITAL | Age: 53
Discharge: HOME | End: 2023-11-09
Attending: INTERNAL MEDICINE
Payer: MEDICARE

## 2023-11-09 VITALS — WEIGHT: 272 LBS | HEIGHT: 71 IN | BODY MASS INDEX: 38.08 KG/M2

## 2023-11-09 DIAGNOSIS — Z72.0 TOBACCO DIPPER: Chronic | ICD-10-CM

## 2023-11-09 DIAGNOSIS — R94.31 NONSPECIFIC ABNORMAL ELECTROCARDIOGRAM (ECG) (EKG): ICD-10-CM

## 2023-11-09 DIAGNOSIS — R06.02 SOB (SHORTNESS OF BREATH): ICD-10-CM

## 2023-11-09 DIAGNOSIS — R55 NEAR SYNCOPE: ICD-10-CM

## 2023-11-09 DIAGNOSIS — R07.2 PRECORDIAL PAIN: Chronic | ICD-10-CM

## 2023-11-09 LAB
CV PHARM DOSE: 0.4 MG
CV STRESS BASE HR: 82 BPM
DIASTOLIC BLOOD PRESSURE: 80 MMHG
LEFT ABI: 1.29
LEFT ARM BP: 133 MMHG
LEFT DORSALIS PEDIS: 162 MMHG
LEFT POSTERIOR TIBIAL: 171 MMHG
LEFT TBI: 0.86
LEFT TOE PRESSURE: 115 MMHG
NUC REST EJECTION FRACTION: 68
OHS CV CPX 1 MINUTE RECOVERY HEART RATE: 110 BPM
OHS CV CPX 85 PERCENT MAX PREDICTED HEART RATE MALE: 140
OHS CV CPX MAX PREDICTED HEART RATE: 165
OHS CV CPX PATIENT IS FEMALE: 0
OHS CV CPX PATIENT IS MALE: 1
OHS CV CPX PEAK DIASTOLIC BLOOD PRESSURE: 74 MMHG
OHS CV CPX PEAK HEAR RATE: 114 BPM
OHS CV CPX PEAK RATE PRESSURE PRODUCT: NORMAL
OHS CV CPX PEAK SYSTOLIC BLOOD PRESSURE: 129 MMHG
OHS CV CPX PERCENT MAX PREDICTED HEART RATE ACHIEVED: 69
OHS CV CPX RATE PRESSURE PRODUCT PRESENTING: 9594
OHS CV PHARM TIME: 1352 MIN
RIGHT ABI: 1.08
RIGHT ARM BP: 124 MMHG
RIGHT DORSALIS PEDIS: 144 MMHG
RIGHT POSTERIOR TIBIAL: 137 MMHG
RIGHT TBI: 0.89
RIGHT TOE PRESSURE: 119 MMHG
SYSTOLIC BLOOD PRESSURE: 117 MMHG

## 2023-11-09 PROCEDURE — A9502 TC99M TETROFOSMIN: HCPCS | Mod: PO

## 2023-11-09 PROCEDURE — 78452 NUCLEAR STRESS - CARDIOLOGY INTERPRETED (CUPID ONLY): ICD-10-PCS | Mod: 26,,, | Performed by: INTERNAL MEDICINE

## 2023-11-09 PROCEDURE — 78452 HT MUSCLE IMAGE SPECT MULT: CPT | Mod: 26,,, | Performed by: INTERNAL MEDICINE

## 2023-11-09 PROCEDURE — 93018 PR CARDIAC STRESS TST,INTERP/REPT ONLY: ICD-10-PCS | Mod: ,,, | Performed by: INTERNAL MEDICINE

## 2023-11-09 PROCEDURE — 93016 NUCLEAR STRESS - CARDIOLOGY INTERPRETED (CUPID ONLY): ICD-10-PCS | Mod: ,,, | Performed by: INTERNAL MEDICINE

## 2023-11-09 PROCEDURE — 93018 CV STRESS TEST I&R ONLY: CPT | Mod: ,,, | Performed by: INTERNAL MEDICINE

## 2023-11-09 PROCEDURE — 63600175 PHARM REV CODE 636 W HCPCS: Mod: PO | Performed by: INTERNAL MEDICINE

## 2023-11-09 PROCEDURE — 93244 HOLTER MONITOR - 72 HOUR: ICD-10-PCS | Mod: ,,, | Performed by: INTERNAL MEDICINE

## 2023-11-09 PROCEDURE — 93017 CV STRESS TEST TRACING ONLY: CPT | Mod: PO

## 2023-11-09 PROCEDURE — 93244 EXT ECG>48HR<7D REV&INTERPJ: CPT | Mod: ,,, | Performed by: INTERNAL MEDICINE

## 2023-11-09 PROCEDURE — 93922 UPR/L XTREMITY ART 2 LEVELS: CPT | Mod: 26,,, | Performed by: INTERNAL MEDICINE

## 2023-11-09 PROCEDURE — 93016 CV STRESS TEST SUPVJ ONLY: CPT | Mod: ,,, | Performed by: INTERNAL MEDICINE

## 2023-11-09 PROCEDURE — 93922 UPR/L XTREMITY ART 2 LEVELS: CPT | Mod: PO

## 2023-11-09 PROCEDURE — 93243 EXT ECG>48HR<7D SCAN A/R: CPT | Mod: PO

## 2023-11-09 PROCEDURE — 93922 ANKLE BRACHIAL INDICES (ABI): ICD-10-PCS | Mod: 26,,, | Performed by: INTERNAL MEDICINE

## 2023-11-09 RX ORDER — REGADENOSON 0.08 MG/ML
0.4 INJECTION, SOLUTION INTRAVENOUS ONCE
Status: COMPLETED | OUTPATIENT
Start: 2023-11-09 | End: 2023-11-09

## 2023-11-09 RX ADMIN — REGADENOSON 0.4 MG: 0.08 INJECTION, SOLUTION INTRAVENOUS at 01:11

## 2023-11-15 ENCOUNTER — OFFICE VISIT (OUTPATIENT)
Dept: CARDIOLOGY | Facility: CLINIC | Age: 53
End: 2023-11-15
Payer: MEDICARE

## 2023-11-15 VITALS
HEART RATE: 86 BPM | WEIGHT: 276.44 LBS | BODY MASS INDEX: 37.44 KG/M2 | DIASTOLIC BLOOD PRESSURE: 71 MMHG | HEIGHT: 72 IN | SYSTOLIC BLOOD PRESSURE: 127 MMHG

## 2023-11-15 DIAGNOSIS — E66.01 SEVERE OBESITY (BMI 35.0-39.9) WITH COMORBIDITY: ICD-10-CM

## 2023-11-15 DIAGNOSIS — I10 PRIMARY HYPERTENSION: Chronic | ICD-10-CM

## 2023-11-15 DIAGNOSIS — R07.2 PRECORDIAL PAIN: ICD-10-CM

## 2023-11-15 DIAGNOSIS — G47.33 OSA ON CPAP: Chronic | ICD-10-CM

## 2023-11-15 DIAGNOSIS — R06.02 SOB (SHORTNESS OF BREATH): Primary | ICD-10-CM

## 2023-11-15 DIAGNOSIS — Z72.0 TOBACCO DIPPER: Chronic | ICD-10-CM

## 2023-11-15 DIAGNOSIS — R59.1 LYMPHADENOPATHY OF HEAD AND NECK: ICD-10-CM

## 2023-11-15 DIAGNOSIS — I34.0 MODERATE MITRAL REGURGITATION: ICD-10-CM

## 2023-11-15 DIAGNOSIS — R94.39 ABNORMAL NUCLEAR STRESS TEST: ICD-10-CM

## 2023-11-15 DIAGNOSIS — R73.03 PRE-DIABETES: Chronic | ICD-10-CM

## 2023-11-15 PROCEDURE — 99215 PR OFFICE/OUTPT VISIT, EST, LEVL V, 40-54 MIN: ICD-10-PCS | Mod: S$GLB,,, | Performed by: INTERNAL MEDICINE

## 2023-11-15 PROCEDURE — 99215 OFFICE O/P EST HI 40 MIN: CPT | Mod: S$GLB,,, | Performed by: INTERNAL MEDICINE

## 2023-11-15 RX ORDER — NAPROXEN SODIUM 220 MG/1
81 TABLET, FILM COATED ORAL ONCE
Status: CANCELLED | OUTPATIENT
Start: 2023-11-15 | End: 2023-11-15

## 2023-11-15 RX ORDER — SODIUM CHLORIDE 9 MG/ML
INJECTION, SOLUTION INTRAVENOUS ONCE
Status: CANCELLED | OUTPATIENT
Start: 2023-11-15 | End: 2023-11-15

## 2023-11-15 RX ORDER — CLOPIDOGREL BISULFATE 75 MG/1
300 TABLET ORAL ONCE
Status: CANCELLED | OUTPATIENT
Start: 2023-11-15 | End: 2023-11-15

## 2023-11-15 NOTE — PROGRESS NOTES
Subjective:    Patient ID:  Bryant Corona is a 53 y.o. male who presents for Shortness of Breath        Shortness of Breath  Associated symptoms include chest pain (TO BACK,) and neck pain. Pertinent negatives include no abdominal pain, claudication, hemoptysis, leg swelling (RLE), orthopnea, PND, syncope or wheezing.       DISCUSSED TESTS ABNORMAL NUCLEAR, MOD MR, NORMAL CAROTIDS, INCIDENTAL FINDING OF A LYMPH NODE, NORMAL SHERIF, MULTIPLE ACHES, MULTIPLE ACCIDENTS IN THE PAST, SEE ROS  Past Medical History:   Diagnosis Date    Anxiety     Back pain     Depression     Hypertension      Past Surgical History:   Procedure Laterality Date    TIBIA FRACTURE SURGERY Right      No family history on file.  Social History     Socioeconomic History    Marital status:    Tobacco Use    Smoking status: Never    Smokeless tobacco: Never       Review of patient's allergies indicates:   Allergen Reactions    Codeine        Current Outpatient Medications:     allopurinoL (ZYLOPRIM) 300 MG tablet, Take 1 tablet (300 mg total) by mouth once daily., Disp: 90 tablet, Rfl: 3    FLUoxetine 40 MG capsule, Take 1 capsule (40 mg total) by mouth once daily., Disp: 90 capsule, Rfl: 3    indomethacin (INDOCIN) 50 MG capsule, Take 1 capsule (50 mg total) by mouth 3 (three) times daily as needed (acute gout pain)., Disp: 30 capsule, Rfl: 1    lisinopriL (PRINIVIL,ZESTRIL) 40 MG tablet, Take 1 tablet (40 mg total) by mouth once daily., Disp: 90 tablet, Rfl: 3    pravastatin (PRAVACHOL) 20 MG tablet, Take 1 tablet (20 mg total) by mouth once daily., Disp: 90 tablet, Rfl: 0    testosterone cypionate (DEPO-TESTOSTERONE) 200 mg/mL injection, Inject 1 mL (200 mg total) into the muscle every 14 (fourteen) days., Disp: 10 mL, Rfl: 1    Review of Systems   Constitutional: Positive for malaise/fatigue. Negative for chills, diaphoresis, night sweats and weight loss.   HENT:  Negative for congestion and nosebleeds.    Eyes:  Negative for blurred  "vision and visual disturbance.   Cardiovascular:  Positive for chest pain (TO BACK,), dyspnea on exertion and near-syncope (GOES TO LIE DOWN). Negative for claudication, cyanosis, irregular heartbeat, leg swelling (RLE), orthopnea, palpitations, paroxysmal nocturnal dyspnea and syncope.   Respiratory:  Positive for shortness of breath. Negative for cough, hemoptysis and wheezing.    Endocrine: Negative for cold intolerance, heat intolerance and polyuria.   Hematologic/Lymphatic: Negative for adenopathy. Does not bruise/bleed easily.   Skin:  Negative for color change, itching and nail changes.   Musculoskeletal:  Positive for arthritis, back pain, joint pain and neck pain. Negative for falls.   Gastrointestinal:  Negative for abdominal pain, change in bowel habit, dysphagia, jaundice, melena and nausea.   Genitourinary:  Negative for dysuria and flank pain.        TESTICULAR MASS THAT HE HAD FOR YEARS   Neurological:  Positive for dizziness. Negative for brief paralysis, focal weakness, light-headedness, loss of balance and weakness. Numbness: R HAND. Paresthesias: R FOOT.  Psychiatric/Behavioral:  Negative for altered mental status and depression. The patient is not nervous/anxious.    Allergic/Immunologic: Negative for persistent infections.        Objective:      Vitals:    11/15/23 1354   BP: 127/71   Pulse: 86   Weight: 125.4 kg (276 lb 7.3 oz)   Height: 5' 11.5" (1.816 m)     Body mass index is 38.02 kg/m².    Physical Exam  Constitutional:       Appearance: Normal appearance. He is morbidly obese.   Neck:      Vascular: Normal carotid pulses. No hepatojugular reflux or JVD.   Cardiovascular:      Rate and Rhythm: No extrasystoles are present.     Pulses:           Carotid pulses are 2+ on the right side and 2+ on the left side.       Radial pulses are 2+ on the right side and 2+ on the left side.        Femoral pulses are 2+ on the right side and 2+ on the left side.       Posterior tibial pulses are 2+ on " the right side and 2+ on the left side.      Heart sounds: Murmur heard.      Systolic murmur is present with a grade of 2/6 at the lower left sternal border.      No friction rub. No S4 sounds.   Pulmonary:      Effort: Pulmonary effort is normal. No respiratory distress.      Breath sounds: Normal breath sounds and air entry.   Musculoskeletal:      Cervical back: Neck supple.      Right lower leg: No edema.      Left lower leg: No edema.      Comments: SLOW GAIT   Neurological:      Mental Status: He is alert and oriented to person, place, and time.      Cranial Nerves: Cranial nerves 2-12 are intact.   Psychiatric:         Mood and Affect: Mood normal.         Speech: Speech normal.         Behavior: Behavior normal.                 ..    Chemistry        Component Value Date/Time     10/16/2023 0845    K 4.6 10/16/2023 0845     10/16/2023 0845    CO2 23 10/16/2023 0845    BUN 15 10/16/2023 0845    CREATININE 1.2 10/16/2023 0845    GLU 93 10/16/2023 0845        Component Value Date/Time    CALCIUM 9.5 10/16/2023 0845    ALKPHOS 81 10/16/2023 0845    AST 11 10/16/2023 0845    ALT 13 10/16/2023 0845    BILITOT 0.8 10/16/2023 0845            ..  Lab Results   Component Value Date    CHOL 184 10/16/2023    CHOL 176 03/11/2023    CHOL 184 03/01/2023     Lab Results   Component Value Date    HDL 30 (L) 10/16/2023    HDL 31 (L) 03/11/2023    HDL 35 (L) 03/01/2023     Lab Results   Component Value Date    LDLCALC 119.2 10/16/2023    LDLCALC 110 03/11/2023    LDLCALC 116.4 03/01/2023     Lab Results   Component Value Date    TRIG 174 (H) 10/16/2023    TRIG 176 (H) 03/11/2023    TRIG 163 (H) 03/01/2023     Lab Results   Component Value Date    CHOLHDL 16.3 (L) 10/16/2023    CHOLHDL 19.0 (L) 03/01/2023     ..  Lab Results   Component Value Date    WBC 9.79 03/01/2023    HGB 16.1 03/01/2023    HCT 48.5 03/01/2023    MCV 92 03/01/2023     03/01/2023       Test(s) Reviewed  I have reviewed the following in  detail:  [x] Stress test   [] Angiography   [x] Echocardiogram   [] Labs   [x] Other:       Assessment:         ICD-10-CM ICD-9-CM   1. SOB (shortness of breath)  R06.02 786.05   2. Abnormal nuclear stress test  R94.39 794.39   3. Moderate mitral regurgitation  I34.0 424.0   4.   Z72.0 305.1   5. LOLA on CPAP  G47.33 327.23   6. Severe obesity (BMI 35.0-39.9) with comorbidity  E66.01 278.01   7. Precordial pain  R07.2 786.51   8. Primary hypertension  I10 401.9   9. Pre-diabetes  R73.03 790.29   10. Lymphadenopathy of head and neck  R59.1 785.6     Problem List Items Addressed This Visit          Pulmonary    SOB (shortness of breath) - Primary    Relevant Orders    Case Request-Cath Lab: Angiogram, Coronary, with Left Heart Cath (Completed)    Vital signs    Cardiac Monitoring - Adult    Basic metabolic panel    Full code       Cardiac/Vascular    Hypertension    Precordial pain    Relevant Orders    Case Request-Cath Lab: Angiogram, Coronary, with Left Heart Cath (Completed)    Vital signs    Cardiac Monitoring - Adult    Basic metabolic panel    Full code    Abnormal nuclear stress test    Relevant Orders    Case Request-Cath Lab: Angiogram, Coronary, with Left Heart Cath (Completed)    Vital signs    Cardiac Monitoring - Adult    Basic metabolic panel    Full code    Moderate mitral regurgitation    Relevant Orders    Vital signs    Cardiac Monitoring - Adult    Basic metabolic panel    Full code       Endocrine    Pre-diabetes    Severe obesity (BMI 35.0-39.9) with comorbidity       Other    LOLA on CPAP        Lymphadenopathy of head and neck    Relevant Orders    US Soft Tissue Head Neck Thyroid (Completed)        Plan:         ABNORMAL NUCLEAR STRESS TEST MODERATE RISK WITH CONTINUED SYMPTOMS ON MEDICAL TREATMENT , MODERATE MITRAL REGURGITATION, DISCUSSED OPTIONS AT LENGTH WILL PROCEED WITH ANGIOGRAPHY RISK AND ALTERNATIVE PAIN PATIENT DETAILS INFORMED CONSENT WAS OBTAINED, WILL  OBTAIN ALSO ULTRASOUND OF THE NECK FOR LYMPHADENOPATHY NEEDS TO FOLLOW-UP WITH PCP MIGHT NEEDS FURTHER EVALUATION RULE OUT LYMPHOMA ETCETERA, CLASS 2 ANGINA NO OVERT HEART FAILURE NO TIA TYPE SYMPTOMS , DIET EXERCISE WEIGHT LOSS, TOBACCO CESSATION ALL QUESTIONS ANSWERED  SOB (shortness of breath)  -     Case Request-Cath Lab: Angiogram, Coronary, with Left Heart Cath; Standing  -     Establish IV access and maintain saline lock; Standing  -     Hold Warfarin; Standing  -     Hold Enoxaparin/subcutaneous Heparin; Standing  -     Hold Heparin drip; Standing  -     Height and weight; Standing  -     Verify informed consent; Standing  -     Vital signs; Standing  -     Cardiac Monitoring - Adult; Standing  -     Pulse Oximetry Q4H; Standing  -     Diet NPO; Standing  -     CBC auto differential; Standing  -     Basic metabolic panel; Standing  -     Full code; Standing    Abnormal nuclear stress test  -     Case Request-Cath Lab: Angiogram, Coronary, with Left Heart Cath; Standing  -     Establish IV access and maintain saline lock; Standing  -     Hold Warfarin; Standing  -     Hold Enoxaparin/subcutaneous Heparin; Standing  -     Hold Heparin drip; Standing  -     Height and weight; Standing  -     Verify informed consent; Standing  -     Vital signs; Standing  -     Cardiac Monitoring - Adult; Standing  -     Pulse Oximetry Q4H; Standing  -     Diet NPO; Standing  -     CBC auto differential; Standing  -     Basic metabolic panel; Standing  -     Full code; Standing    Moderate mitral regurgitation  -     Establish IV access and maintain saline lock; Standing  -     Hold Warfarin; Standing  -     Hold Enoxaparin/subcutaneous Heparin; Standing  -     Hold Heparin drip; Standing  -     Height and weight; Standing  -     Verify informed consent; Standing  -     Vital signs; Standing  -     Cardiac Monitoring - Adult; Standing  -     Pulse Oximetry Q4H; Standing  -     Diet NPO; Standing  -     CBC auto differential;  Standing  -     Basic metabolic panel; Standing  -     Full code; Standing        LOLA on CPAP    Severe obesity (BMI 35.0-39.9) with comorbidity    Precordial pain  -     Case Request-Cath Lab: Angiogram, Coronary, with Left Heart Cath; Standing  -     Establish IV access and maintain saline lock; Standing  -     Hold Warfarin; Standing  -     Hold Enoxaparin/subcutaneous Heparin; Standing  -     Hold Heparin drip; Standing  -     Height and weight; Standing  -     Verify informed consent; Standing  -     Vital signs; Standing  -     Cardiac Monitoring - Adult; Standing  -     Pulse Oximetry Q4H; Standing  -     Diet NPO; Standing  -     CBC auto differential; Standing  -     Basic metabolic panel; Standing  -     Full code; Standing    Primary hypertension    Pre-diabetes    Lymphadenopathy of head and neck  -     US Soft Tissue Head Neck Thyroid; Future; Expected date: 11/15/2023    Other orders  -     0.9%  NaCl infusion  -     aspirin chewable tablet 81 mg  -     clopidogreL tablet 300 mg    RTC Low level/low impact aerobic exercise 5x's/wk. Heart healthy diet and risk factor modification.    See labs and med orders.    Aerobic exercise 5x's/wk. Heart healthy diet and risk factor modification.    See labs and med orders.

## 2023-11-16 ENCOUNTER — HOSPITAL ENCOUNTER (OUTPATIENT)
Dept: RADIOLOGY | Facility: HOSPITAL | Age: 53
Discharge: HOME | End: 2023-11-16
Attending: INTERNAL MEDICINE
Payer: MEDICARE

## 2023-11-16 DIAGNOSIS — R59.1 LYMPHADENOPATHY OF HEAD AND NECK: ICD-10-CM

## 2023-11-16 PROCEDURE — 76536 US EXAM OF HEAD AND NECK: CPT | Mod: TC,PO

## 2023-11-16 PROCEDURE — 76536 US SOFT TISSUE HEAD NECK THYROID: ICD-10-PCS | Mod: 26,,, | Performed by: RADIOLOGY

## 2023-11-16 PROCEDURE — 76536 US EXAM OF HEAD AND NECK: CPT | Mod: 26,,, | Performed by: RADIOLOGY

## 2023-11-17 ENCOUNTER — TELEPHONE (OUTPATIENT)
Dept: CARDIOLOGY | Facility: CLINIC | Age: 53
End: 2023-11-17
Payer: MEDICARE

## 2023-11-17 DIAGNOSIS — R59.1 LYMPHADENOPATHY OF HEAD AND NECK: Primary | ICD-10-CM

## 2023-11-21 LAB
OHS CV EVENT MONITOR DAY: 0
OHS CV HOLTER LENGTH DECIMAL HOURS: 72
OHS CV HOLTER LENGTH HOURS: 72
OHS CV HOLTER LENGTH MINUTES: 0
OHS CV HOLTER SINUS AVERAGE HR: 92
OHS CV HOLTER SINUS MAX HR: 145
OHS CV HOLTER SINUS MIN HR: 62

## 2023-11-28 ENCOUNTER — TELEPHONE (OUTPATIENT)
Dept: CARDIOLOGY | Facility: CLINIC | Age: 53
End: 2023-11-28
Payer: MEDICARE

## 2023-11-28 ENCOUNTER — TELEPHONE (OUTPATIENT)
Dept: PRIMARY CARE CLINIC | Facility: CLINIC | Age: 53
End: 2023-11-28
Payer: MEDICARE

## 2023-11-28 DIAGNOSIS — R59.1 LYMPHADENOPATHY: Primary | ICD-10-CM

## 2023-11-28 NOTE — TELEPHONE ENCOUNTER
Patient was found to have an abnormal lymph node seen on a carotid ultrasound. He needs follow up lab and a ENT consult. Orders have been placed.

## 2023-11-28 NOTE — TELEPHONE ENCOUNTER
----- Message from Ced Teran MD sent at 11/27/2023  6:39 PM CST -----  ABNORMAL LYMPH NODE ON THE CAROTID ULTRASOUND AND ON SOFT TISSUE NECK ULTRASOUND SEND REFERRAL TO ENT I DO NOT SEE AN APPOINTMENT PLEASE FOLLOW-UP ON THAT HE MIGHT NEED MORE WORKUP FOR POSSIBLE MALIGNANCY

## 2023-11-28 NOTE — TELEPHONE ENCOUNTER
----- Message from Laina Monroy sent at 11/28/2023  7:36 AM CST -----  Contact: Self  Type: Needs Medical Advice    Who Called:  Patient  What is this regarding?:  He is needing to reschedule his heart catherer surgery due to car troubles.  Best Call Back Number:  [phone]  Additional Information:  Please call the patient back at the phone number listed above to advise. Thank you!

## 2023-11-29 ENCOUNTER — OFFICE VISIT (OUTPATIENT)
Dept: CARDIOLOGY | Facility: CLINIC | Age: 53
End: 2023-11-29
Attending: INTERNAL MEDICINE
Payer: MEDICARE

## 2023-11-29 VITALS
DIASTOLIC BLOOD PRESSURE: 88 MMHG | HEIGHT: 71 IN | BODY MASS INDEX: 39.1 KG/M2 | SYSTOLIC BLOOD PRESSURE: 139 MMHG | WEIGHT: 279.31 LBS | HEART RATE: 90 BPM

## 2023-11-29 DIAGNOSIS — R59.1 LYMPHADENOPATHY OF HEAD AND NECK: Chronic | ICD-10-CM

## 2023-11-29 DIAGNOSIS — R07.2 PRECORDIAL PAIN: ICD-10-CM

## 2023-11-29 DIAGNOSIS — I10 PRIMARY HYPERTENSION: Primary | ICD-10-CM

## 2023-11-29 DIAGNOSIS — E66.01 SEVERE OBESITY (BMI 35.0-39.9) WITH COMORBIDITY: Chronic | ICD-10-CM

## 2023-11-29 DIAGNOSIS — Z72.0 TOBACCO DIPPER: Chronic | ICD-10-CM

## 2023-11-29 PROCEDURE — 99213 PR OFFICE/OUTPT VISIT, EST, LEVL III, 20-29 MIN: ICD-10-PCS | Mod: 25,S$GLB,, | Performed by: INTERNAL MEDICINE

## 2023-11-29 PROCEDURE — 93000 ELECTROCARDIOGRAM COMPLETE: CPT | Mod: ,,, | Performed by: INTERNAL MEDICINE

## 2023-11-29 PROCEDURE — 93000 EKG 12-LEAD: ICD-10-PCS | Mod: ,,, | Performed by: INTERNAL MEDICINE

## 2023-11-29 PROCEDURE — 99213 OFFICE O/P EST LOW 20 MIN: CPT | Mod: 25,S$GLB,, | Performed by: INTERNAL MEDICINE

## 2023-11-29 RX ORDER — LOSARTAN POTASSIUM 50 MG/1
50 TABLET ORAL DAILY
COMMUNITY
End: 2024-02-22 | Stop reason: SDUPTHER

## 2023-11-29 RX ORDER — ERGOCALCIFEROL 1.25 MG/1
50000 CAPSULE ORAL
COMMUNITY
End: 2024-02-22 | Stop reason: SDUPTHER

## 2023-11-29 RX ORDER — ALPRAZOLAM 0.5 MG/1
TABLET ORAL
COMMUNITY
End: 2024-02-22

## 2023-11-29 RX ORDER — HYDROCODONE BITARTRATE AND ACETAMINOPHEN 10; 325 MG/1; MG/1
1 TABLET ORAL 2 TIMES DAILY PRN
COMMUNITY
End: 2023-11-29

## 2023-11-29 RX ORDER — METOPROLOL SUCCINATE 25 MG/1
25 TABLET, EXTENDED RELEASE ORAL DAILY
Qty: 90 TABLET | Refills: 0 | Status: SHIPPED | OUTPATIENT
Start: 2023-11-29 | End: 2024-02-22

## 2023-11-29 NOTE — PROGRESS NOTES
Subjective:    Patient ID:  Bryant Corona is a 53 y.o. male who presents for SOB (shortness of breath) 10/18/2023 SOB (shortness of darren and Chest Pain        HPI  REQUESTED OFFICE VISIT, PATIENT CX CATH , SOCIAL TROUBLE, DID NOT WAKE UP, RE-SCHEDULED, STILL DIPPING, STRESS, CHRONIC PAIN, BP UP, HAS BEEN HAVING SOME CHEST PAIN, SEE REVIEW OF SYSTEMS    Past Medical History:   Diagnosis Date    Anxiety     Back pain     Depression     Gout, unspecified     Hyperlipidemia     Hypertension     Sleep apnea     uses cpap     Past Surgical History:   Procedure Laterality Date    left knee surgery      TIBIA FRACTURE SURGERY Right      No family history on file.  Social History     Socioeconomic History    Marital status:    Tobacco Use    Smoking status: Some Days     Types: Cigarettes    Smokeless tobacco: Never    Tobacco comments:     Very seldom   Substance and Sexual Activity    Alcohol use: Yes     Comment: seldom    Drug use: Never       Review of patient's allergies indicates:   Allergen Reactions    Codeine Itching       Current Outpatient Medications:     allopurinoL (ZYLOPRIM) 300 MG tablet, Take 1 tablet (300 mg total) by mouth once daily., Disp: 90 tablet, Rfl: 3    ergocalciferol (ERGOCALCIFEROL) 50,000 unit Cap, Take 50,000 Units by mouth every 7 days., Disp: , Rfl:     FLUoxetine 40 MG capsule, Take 1 capsule (40 mg total) by mouth once daily., Disp: 90 capsule, Rfl: 3    losartan (COZAAR) 50 MG tablet, Take 50 mg by mouth once daily., Disp: , Rfl:     pravastatin (PRAVACHOL) 20 MG tablet, Take 1 tablet (20 mg total) by mouth once daily., Disp: 90 tablet, Rfl: 0    testosterone cypionate (DEPO-TESTOSTERONE) 200 mg/mL injection, Inject 1 mL (200 mg total) into the muscle every 14 (fourteen) days., Disp: 10 mL, Rfl: 1    ALPRAZolam (XANAX) 0.5 MG tablet, TAKE 1 TABLET BY MOUTH EVERY 6 TO 8 HOURS AS NEEDED FOR 30 DAYS, Disp: , Rfl:     indomethacin (INDOCIN) 50 MG capsule, Take 1 capsule (50 mg total)  "by mouth 3 (three) times daily as needed (acute gout pain). (Patient not taking: Reported on 11/29/2023), Disp: 30 capsule, Rfl: 1    metoprolol succinate (TOPROL-XL) 25 MG 24 hr tablet, Take 1 tablet (25 mg total) by mouth once daily., Disp: 90 tablet, Rfl: 0    Review of Systems   Constitutional: Positive for malaise/fatigue. Negative for chills, diaphoresis, night sweats and weight loss.   HENT:  Negative for congestion and nosebleeds.    Eyes:  Negative for blurred vision and visual disturbance.   Cardiovascular:  Positive for chest pain (TO BACK,), dyspnea on exertion and near-syncope (GOES TO LIE DOWN). Negative for claudication, cyanosis, irregular heartbeat, leg swelling (RLE), orthopnea, palpitations, paroxysmal nocturnal dyspnea and syncope.   Respiratory:  Positive for shortness of breath. Negative for cough, hemoptysis and wheezing.    Endocrine: Negative for cold intolerance, heat intolerance and polyuria.   Hematologic/Lymphatic: Negative for adenopathy. Does not bruise/bleed easily.   Skin:  Negative for color change, itching and nail changes.   Musculoskeletal:  Positive for back pain, joint pain and neck pain. Negative for falls.   Gastrointestinal:  Negative for abdominal pain, change in bowel habit, dysphagia, jaundice, melena and nausea.   Genitourinary:  Negative for dysuria and flank pain.        TESTICULAR MASS THAT HE HAD FOR YEARS   Neurological:  Negative for brief paralysis, focal weakness, light-headedness, loss of balance and weakness. Numbness: R HAND.  Psychiatric/Behavioral:  Negative for altered mental status. The patient is nervous/anxious.    Allergic/Immunologic: Negative for persistent infections.        Objective:      Vitals:    11/29/23 0909 11/29/23 0919   BP: (!) 151/89 139/88   Pulse: 90    Weight: 126.7 kg (279 lb 5.2 oz)    Height: 5' 11" (1.803 m)    PainSc:   4    PainLoc: Chest      Body mass index is 38.96 kg/m².    Physical Exam  Constitutional:       Appearance: " Normal appearance. He is morbidly obese.   Neck:      Vascular: Normal carotid pulses. No hepatojugular reflux or JVD.   Cardiovascular:      Rate and Rhythm: No extrasystoles are present.     Pulses:           Carotid pulses are 2+ on the right side and 2+ on the left side.       Radial pulses are 2+ on the right side and 2+ on the left side.        Femoral pulses are 2+ on the right side and 2+ on the left side.       Posterior tibial pulses are 2+ on the right side and 2+ on the left side.      Heart sounds: Murmur heard.      Systolic murmur is present with a grade of 2/6 at the lower left sternal border.      No friction rub. No S4 sounds.   Pulmonary:      Effort: Pulmonary effort is normal. No respiratory distress.      Breath sounds: Normal breath sounds and air entry.   Chest:      Chest wall: Tenderness present.       Musculoskeletal:      Cervical back: Neck supple.      Right lower leg: No edema.      Left lower leg: No edema.   Neurological:      Mental Status: He is alert and oriented to person, place, and time.      Cranial Nerves: Cranial nerves 2-12 are intact.   Psychiatric:         Mood and Affect: Mood normal.         Speech: Speech normal.         Behavior: Behavior normal.                 ..    Chemistry        Component Value Date/Time     10/16/2023 0845    K 4.6 10/16/2023 0845     10/16/2023 0845    CO2 23 10/16/2023 0845    BUN 15 10/16/2023 0845    CREATININE 1.2 10/16/2023 0845    GLU 93 10/16/2023 0845        Component Value Date/Time    CALCIUM 9.5 10/16/2023 0845    ALKPHOS 81 10/16/2023 0845    AST 11 10/16/2023 0845    ALT 13 10/16/2023 0845    BILITOT 0.8 10/16/2023 0845            ..  Lab Results   Component Value Date    CHOL 184 10/16/2023    CHOL 176 03/11/2023    CHOL 184 03/01/2023     Lab Results   Component Value Date    HDL 30 (L) 10/16/2023    HDL 31 (L) 03/11/2023    HDL 35 (L) 03/01/2023     Lab Results   Component Value Date    LDLCALC 119.2 10/16/2023     LDLCALC 110 03/11/2023    LDLCALC 116.4 03/01/2023     Lab Results   Component Value Date    TRIG 174 (H) 10/16/2023    TRIG 176 (H) 03/11/2023    TRIG 163 (H) 03/01/2023     Lab Results   Component Value Date    CHOLHDL 16.3 (L) 10/16/2023    CHOLHDL 19.0 (L) 03/01/2023     ..  Lab Results   Component Value Date    WBC 9.79 03/01/2023    HGB 16.1 03/01/2023    HCT 48.5 03/01/2023    MCV 92 03/01/2023     03/01/2023       Test(s) Reviewed  I have reviewed the following in detail:  [] Stress test   [] Angiography   [] Echocardiogram   [] Labs   [x] Other:       Assessment:         ICD-10-CM ICD-9-CM   1. Primary hypertension  I10 401.9   2. Precordial pain  R07.2 786.51   3.   Z72.0 305.1   4. Lymphadenopathy of head and neck  R59.1 785.6   5. Severe obesity (BMI 35.0-39.9) with comorbidity  E66.01 278.01     Problem List Items Addressed This Visit          Cardiac/Vascular    Hypertension - Primary    Precordial pain    Relevant Orders    EKG 12-lead (Completed)    X-Ray Chest 1 View       Endocrine    Severe obesity (BMI 35.0-39.9) with comorbidity       Other        Relevant Orders    X-Ray Chest 1 View    Lymphadenopathy of head and neck        Plan:         EKG WNL, ADD TOPROL, FOR BLOOD PRESSURE AND ANGINA CATH AND ENT SOON, THE EVALUATE LYMPHADENOPATHY, CHECK CXR WITH CATHETERIZATION, CLASS 2-3 ANGINAL-TYPE SYMPTOMS NO OVERT HEART FAILURE DIET EXERCISE TOBACCO CESSATION NEEDS WEIGHT LOSS, RETURN TO CLINIC IN FEW WEEKS AFTER TESTS  Primary hypertension  Comments:  ADJUST MEDS    Precordial pain  -     EKG 12-lead  -     X-Ray Chest 1 View; Future; Expected date: 11/29/2023      -     X-Ray Chest 1 View; Future; Expected date: 11/29/2023    Lymphadenopathy of head and neck    Severe obesity (BMI 35.0-39.9) with comorbidity    Other orders  -     metoprolol succinate (TOPROL-XL) 25 MG 24 hr tablet; Take 1 tablet (25 mg total) by mouth once daily.  Dispense: 90  tablet; Refill: 0    RTC Low level/low impact aerobic exercise 5x's/wk. Heart healthy diet and risk factor modification.    See labs and med orders.    Aerobic exercise 5x's/wk. Heart healthy diet and risk factor modification.    See labs and med orders.

## 2023-12-05 ENCOUNTER — OFFICE VISIT (OUTPATIENT)
Dept: OTOLARYNGOLOGY | Facility: CLINIC | Age: 53
End: 2023-12-05
Payer: MEDICARE

## 2023-12-05 ENCOUNTER — LAB VISIT (OUTPATIENT)
Dept: LAB | Facility: HOSPITAL | Age: 53
End: 2023-12-05
Attending: OTOLARYNGOLOGY
Payer: MEDICARE

## 2023-12-05 VITALS — BODY MASS INDEX: 40.34 KG/M2 | WEIGHT: 288.13 LBS | HEIGHT: 71 IN

## 2023-12-05 DIAGNOSIS — Z72.0 TOBACCO USE: ICD-10-CM

## 2023-12-05 DIAGNOSIS — R59.1 LYMPHADENOPATHY OF HEAD AND NECK: ICD-10-CM

## 2023-12-05 LAB
CREAT SERPL-MCNC: 0.9 MG/DL (ref 0.5–1.4)
EST. GFR  (NO RACE VARIABLE): >60 ML/MIN/1.73 M^2

## 2023-12-05 PROCEDURE — 99213 OFFICE O/P EST LOW 20 MIN: CPT | Mod: PBBFAC,PO,25 | Performed by: OTOLARYNGOLOGY

## 2023-12-05 PROCEDURE — 99204 PR OFFICE/OUTPT VISIT, NEW, LEVL IV, 45-59 MIN: ICD-10-PCS | Mod: 25,S$PBB,, | Performed by: OTOLARYNGOLOGY

## 2023-12-05 PROCEDURE — 36415 COLL VENOUS BLD VENIPUNCTURE: CPT | Mod: PO | Performed by: OTOLARYNGOLOGY

## 2023-12-05 PROCEDURE — 99204 OFFICE O/P NEW MOD 45 MIN: CPT | Mod: 25,S$PBB,, | Performed by: OTOLARYNGOLOGY

## 2023-12-05 PROCEDURE — 31575 DIAGNOSTIC LARYNGOSCOPY: CPT | Mod: PBBFAC,PO | Performed by: OTOLARYNGOLOGY

## 2023-12-05 PROCEDURE — 99999 PR PBB SHADOW E&M-EST. PATIENT-LVL III: CPT | Mod: PBBFAC,,, | Performed by: OTOLARYNGOLOGY

## 2023-12-05 PROCEDURE — 31575 DIAGNOSTIC LARYNGOSCOPY: CPT | Mod: S$PBB,,, | Performed by: OTOLARYNGOLOGY

## 2023-12-05 PROCEDURE — 31575 PR LARYNGOSCOPY, FLEXIBLE; DIAGNOSTIC: ICD-10-PCS | Mod: S$PBB,,, | Performed by: OTOLARYNGOLOGY

## 2023-12-05 PROCEDURE — 99999 PR PBB SHADOW E&M-EST. PATIENT-LVL III: ICD-10-PCS | Mod: PBBFAC,,, | Performed by: OTOLARYNGOLOGY

## 2023-12-05 PROCEDURE — 82565 ASSAY OF CREATININE: CPT | Performed by: OTOLARYNGOLOGY

## 2023-12-05 NOTE — PROGRESS NOTES
Subjective:       Patient ID: Bryant Corona is a 53 y.o. male.    Chief Complaint: Consult (Swollen lymph nodes)    Bryant is here for lymphadenopathy.   This was noted on carotid ultrasound. Length of time unknown.   He notes neck swelling for years, which fluctuates up and down but never really resolves.  No sore throat or otalgia. No pain. Occurs on both sides but more noticeable to the right,     He does use chewing tobacco, moreso in the past yr.   No fam hx of cancer, including thyroid cancer.     Patient validated questionnaires (if applicable):      %            No data to display                   No data to display                   No data to display                     Social History     Tobacco Use   Smoking Status Some Days    Types: Cigarettes   Smokeless Tobacco Never   Tobacco Comments    Very seldom     Social History     Substance and Sexual Activity   Alcohol Use Yes    Comment: seldom          Objective:        Constitutional:   He is oriented to person, place, and time. He appears well-developed and well-nourished. He appears alert. He is active. Normal speech.      Head:  Normocephalic and atraumatic. Head is without TMJ tenderness. No scars. Salivary glands normal.  Facial strength is normal.      Ears:    Right Ear: No drainage or swelling. No middle ear effusion.   Left Ear: No drainage or swelling.  No middle ear effusion.     Nose:  No mucosal edema, rhinorrhea or sinus tenderness. No turbinate hypertrophy.      Mouth/Throat  Oropharynx clear and moist without lesions or asymmetry, normal uvula midline and mirror exam normal. Normal dentition. No uvula swelling, lacerations or trismus. No oropharyngeal exudate. Tonsillar erythema, tonsillar exudate.    Hyperactive gag reflux. Unable to visualize cords on mirror laryngoscopy.          Neck:  Full range of motion with neck supple and no adenopathy. Thyroid tenderness is present. No tracheal deviation, no edema, no erythema, normal range  of motion, no stridor, no crepitus and no neck rigidity present. No thyroid mass present.     He has cervical adenopathy (Right palpable 2-3 cm mass deep to SCM, level II).   Prominent supraclavicular fat pad bilaterally       Cardiovascular:    Intact distal pulses and normal pulses.              Pulmonary/Chest:   Effort normal and breath sounds normal. No stridor.     Psychiatric:   His speech is normal and behavior is normal. His mood appears not anxious. His affect is not labile.     Neurological:   He is alert and oriented to person, place, and time. No sensory deficit.     Skin:   No abrasions, lacerations, lesions, or rashes. No abrasion and no bruising noted.         Tests / Results:  Ultrasound personally reviewed which shows a 3.9 cm hypoechoic nodule of the right neck.        Pre-procedure diagnosis: Diagnoses of Lymphadenopathy of head and neck and Tobacco use were pertinent to this visit.     Post-procedure diagnosis: same    Procedure: Flexible fiberoptic laryngoscopy    Surgeon: Jesus Lopez MD    Anesthesia: 4% Lidocaine with 0.25% Phenylephrine topical    Risks, benefits, and alternatives of the procedure were discussed with the patient, and the patient consented to the fiberoptic examination.  We applied a topical nasal decongestant and analgesic.  After adequate anesthesia was obtained, the flexible fiberoptic scope was passed through the nasal cavity. The entire pharynx (nasopharynx to hypopharynx) and the larynx were visualized. At the end of the examination, the scope was removed. The patient tolerated the procedure well with no complications.     Findings:  -     Laryngeal mucosa is normal  -     Post-cricoid region: moderate PCE  -     Lingual tonsils have Grade 1 hypertrophy - symmetric  -     Adenoids have NO  hypertrophy  -     Right vocal fold: normal mobility     mass/lesion: none  -     Left vocal fold: normal mobility     mass/lesion: none  -     Other findings: none    Assessment:        1. Lymphadenopathy of head and neck    2. Tobacco use          Plan:         Recommend CT Neck with contrast  FNA of right cervical lymph node

## 2023-12-14 ENCOUNTER — HOSPITAL ENCOUNTER (OUTPATIENT)
Dept: RADIOLOGY | Facility: HOSPITAL | Age: 53
Discharge: HOME | End: 2023-12-14
Attending: INTERNAL MEDICINE
Payer: MEDICARE

## 2023-12-14 ENCOUNTER — HOSPITAL ENCOUNTER (OUTPATIENT)
Dept: RADIOLOGY | Facility: HOSPITAL | Age: 53
Discharge: HOME | End: 2023-12-14
Attending: OTOLARYNGOLOGY
Payer: MEDICARE

## 2023-12-14 ENCOUNTER — TELEPHONE (OUTPATIENT)
Dept: OTOLARYNGOLOGY | Facility: CLINIC | Age: 53
End: 2023-12-14
Payer: MEDICARE

## 2023-12-14 DIAGNOSIS — R07.2 PRECORDIAL PAIN: ICD-10-CM

## 2023-12-14 DIAGNOSIS — Z72.0 TOBACCO USE: ICD-10-CM

## 2023-12-14 DIAGNOSIS — Z72.0 TOBACCO DIPPER: Chronic | ICD-10-CM

## 2023-12-14 DIAGNOSIS — R59.1 LYMPHADENOPATHY OF HEAD AND NECK: ICD-10-CM

## 2023-12-14 PROCEDURE — 71046 XR CHEST PA AND LATERAL: ICD-10-PCS | Mod: 26,,, | Performed by: RADIOLOGY

## 2023-12-14 PROCEDURE — 70491 CT SOFT TISSUE NECK WITH CONTRAST: ICD-10-PCS | Mod: 26,,, | Performed by: RADIOLOGY

## 2023-12-14 PROCEDURE — 71046 X-RAY EXAM CHEST 2 VIEWS: CPT | Mod: 26,,, | Performed by: RADIOLOGY

## 2023-12-14 PROCEDURE — 25500020 PHARM REV CODE 255: Mod: PO | Performed by: OTOLARYNGOLOGY

## 2023-12-14 PROCEDURE — 70491 CT SOFT TISSUE NECK W/DYE: CPT | Mod: 26,,, | Performed by: RADIOLOGY

## 2023-12-14 PROCEDURE — 70491 CT SOFT TISSUE NECK W/DYE: CPT | Mod: TC,PO

## 2023-12-14 PROCEDURE — 71046 X-RAY EXAM CHEST 2 VIEWS: CPT | Mod: TC,FY,PO

## 2023-12-14 RX ADMIN — IOHEXOL 75 ML: 350 INJECTION, SOLUTION INTRAVENOUS at 11:12

## 2023-12-14 NOTE — TELEPHONE ENCOUNTER
----- Message from Jesus Lopez MD sent at 12/14/2023  3:34 PM CST -----  Pls let patient know that the image shows the enlarged lymph node. There are no other enlarged masses seen, so for now this is the only spot. Awaiting the biopsy and results to dictate next steps.

## 2023-12-19 ENCOUNTER — TELEPHONE (OUTPATIENT)
Dept: RADIOLOGY | Facility: HOSPITAL | Age: 53
End: 2023-12-19
Payer: MEDICARE

## 2023-12-19 ENCOUNTER — HOSPITAL ENCOUNTER (OUTPATIENT)
Dept: RADIOLOGY | Facility: HOSPITAL | Age: 53
Discharge: HOME | End: 2023-12-19
Attending: OTOLARYNGOLOGY
Payer: MEDICARE

## 2024-01-02 ENCOUNTER — HOSPITAL ENCOUNTER (OUTPATIENT)
Dept: RADIOLOGY | Facility: HOSPITAL | Age: 54
Discharge: HOME | End: 2024-01-02
Attending: OTOLARYNGOLOGY
Payer: MEDICARE

## 2024-01-02 DIAGNOSIS — R59.1 LYMPHADENOPATHY OF HEAD AND NECK: ICD-10-CM

## 2024-01-02 DIAGNOSIS — Z72.0 TOBACCO USE: ICD-10-CM

## 2024-01-02 PROCEDURE — 88342 IMHCHEM/IMCYTCHM 1ST ANTB: CPT | Mod: 26,,, | Performed by: STUDENT IN AN ORGANIZED HEALTH CARE EDUCATION/TRAINING PROGRAM

## 2024-01-02 PROCEDURE — 88333 PATH CONSLTJ SURG CYTO XM 1: CPT | Mod: PO | Performed by: STUDENT IN AN ORGANIZED HEALTH CARE EDUCATION/TRAINING PROGRAM

## 2024-01-02 PROCEDURE — 76942 ECHO GUIDE FOR BIOPSY: CPT | Mod: 26,RT,, | Performed by: RADIOLOGY

## 2024-01-02 PROCEDURE — 88341 IMHCHEM/IMCYTCHM EA ADD ANTB: CPT | Mod: 26,,, | Performed by: STUDENT IN AN ORGANIZED HEALTH CARE EDUCATION/TRAINING PROGRAM

## 2024-01-02 PROCEDURE — 88307 TISSUE EXAM BY PATHOLOGIST: CPT | Mod: 26,,, | Performed by: STUDENT IN AN ORGANIZED HEALTH CARE EDUCATION/TRAINING PROGRAM

## 2024-01-02 PROCEDURE — 88341 IMHCHEM/IMCYTCHM EA ADD ANTB: CPT | Mod: PO | Performed by: STUDENT IN AN ORGANIZED HEALTH CARE EDUCATION/TRAINING PROGRAM

## 2024-01-02 PROCEDURE — 38505 NEEDLE BIOPSY LYMPH NODES: CPT | Mod: RT,,, | Performed by: RADIOLOGY

## 2024-01-02 PROCEDURE — 88333 PATH CONSLTJ SURG CYTO XM 1: CPT | Mod: 26,,, | Performed by: STUDENT IN AN ORGANIZED HEALTH CARE EDUCATION/TRAINING PROGRAM

## 2024-01-02 PROCEDURE — 27201068 US BIOPSY LYMPH NODES (XPD): Mod: PO

## 2024-01-02 PROCEDURE — 88307 TISSUE EXAM BY PATHOLOGIST: CPT | Mod: PO | Performed by: STUDENT IN AN ORGANIZED HEALTH CARE EDUCATION/TRAINING PROGRAM

## 2024-01-02 PROCEDURE — 25000003 PHARM REV CODE 250: Mod: PO | Performed by: OTOLARYNGOLOGY

## 2024-01-02 PROCEDURE — 88342 IMHCHEM/IMCYTCHM 1ST ANTB: CPT | Mod: PO | Performed by: STUDENT IN AN ORGANIZED HEALTH CARE EDUCATION/TRAINING PROGRAM

## 2024-01-02 RX ORDER — LIDOCAINE HYDROCHLORIDE 10 MG/ML
5 INJECTION INFILTRATION; PERINEURAL ONCE
Status: COMPLETED | OUTPATIENT
Start: 2024-01-02 | End: 2024-01-02

## 2024-01-02 RX ADMIN — LIDOCAINE HYDROCHLORIDE 5 ML: 10 INJECTION, SOLUTION INFILTRATION; PERINEURAL at 08:01

## 2024-01-08 ENCOUNTER — TELEPHONE (OUTPATIENT)
Dept: OTOLARYNGOLOGY | Facility: CLINIC | Age: 54
End: 2024-01-08
Payer: MEDICARE

## 2024-01-08 LAB
COMMENT: NORMAL
FINAL PATHOLOGIC DIAGNOSIS: NORMAL
GROSS: NORMAL
Lab: NORMAL
MICROSCOPIC EXAM: NORMAL

## 2024-01-08 NOTE — TELEPHONE ENCOUNTER
----- Message from Jenn Elam sent at 1/8/2024 10:24 AM CST -----  Type:  Test Results    Who Called:  Pt    Name of Test (Lab/Mammo/Etc):  needle biopsy    Date of Test:  12/5    Ordering Provider:  Dr Rose    Where the test was performed:  [facility]    Would the patient rather a call back or a response via [facility]?  Call back    Best Call Back Number:  [phone]    Additional Information:  Pt is wanting to speak with someone about results.   Please call back to advise. Thanks!

## 2024-01-08 NOTE — TELEPHONE ENCOUNTER
S/w pt and advised that the results are not back and it could take up to 14 days to receive those results. Advised pt that Dr. Lopez will; call once he has received them, pt verbalized understanding.

## 2024-01-10 ENCOUNTER — TELEPHONE (OUTPATIENT)
Dept: OTOLARYNGOLOGY | Facility: CLINIC | Age: 54
End: 2024-01-10
Payer: MEDICARE

## 2024-01-10 DIAGNOSIS — D36.11 SCHWANNOMA OF NERVE OF NECK: Primary | ICD-10-CM

## 2024-01-10 NOTE — TELEPHONE ENCOUNTER
Pt returned my call, reviewed results and recommendations with pt, he verbalized understanding. Recall letter has been placed for 6 mo CT.

## 2024-01-10 NOTE — TELEPHONE ENCOUNTER
----- Message from Jesus Lopez MD sent at 1/10/2024  3:13 PM CST -----  I tried to call pt multiple times past few days - No answer. Left voicemail.  Santos day'd on this as pt is seeing him tomorrow so maybe can relay info for me.    Please let pt know that biopsy is BENIGN, consistent with benign nerve tumor. Treatment is usually observation unless it is symptomatically larger. Recommend re-imaging in 6 months then yearly if minimal change.

## 2024-01-10 NOTE — TELEPHONE ENCOUNTER
Called pt to review results, a female answered the phone and told him that a nurse was on the phone for him. Pt asked if she answered his phone, she replied yes, he asked her why. Pt then hung the phone up. I tried to call back but no answer. Called his wife and relayed message for pt to call for his results.

## 2024-01-18 DIAGNOSIS — I10 HYPERTENSION, UNSPECIFIED TYPE: Primary | ICD-10-CM

## 2024-01-18 DIAGNOSIS — R06.02 SOB (SHORTNESS OF BREATH): ICD-10-CM

## 2024-01-18 DIAGNOSIS — E78.00 ELEVATED CHOLESTEROL: ICD-10-CM

## 2024-01-18 RX ORDER — PRAVASTATIN SODIUM 20 MG/1
20 TABLET ORAL
Qty: 90 TABLET | Refills: 0 | Status: SHIPPED | OUTPATIENT
Start: 2024-01-18 | End: 2024-02-22

## 2024-02-06 DIAGNOSIS — Z12.11 COLON CANCER SCREENING: ICD-10-CM

## 2024-02-19 ENCOUNTER — HOSPITAL ENCOUNTER (OUTPATIENT)
Dept: RADIOLOGY | Facility: HOSPITAL | Age: 54
Discharge: HOME | End: 2024-02-19
Attending: OTOLARYNGOLOGY
Payer: MEDICARE

## 2024-02-19 DIAGNOSIS — D36.11 SCHWANNOMA OF NERVE OF NECK: ICD-10-CM

## 2024-02-19 PROCEDURE — 25500020 PHARM REV CODE 255: Mod: PO | Performed by: OTOLARYNGOLOGY

## 2024-02-19 PROCEDURE — 70491 CT SOFT TISSUE NECK W/DYE: CPT | Mod: TC,PO

## 2024-02-19 PROCEDURE — 70491 CT SOFT TISSUE NECK W/DYE: CPT | Mod: 26,,, | Performed by: RADIOLOGY

## 2024-02-19 RX ADMIN — IOHEXOL 75 ML: 350 INJECTION, SOLUTION INTRAVENOUS at 03:02

## 2024-02-21 ENCOUNTER — OFFICE VISIT (OUTPATIENT)
Dept: CARDIOLOGY | Facility: CLINIC | Age: 54
End: 2024-02-21
Payer: MEDICARE

## 2024-02-21 VITALS
BODY MASS INDEX: 39.1 KG/M2 | HEART RATE: 83 BPM | SYSTOLIC BLOOD PRESSURE: 138 MMHG | HEIGHT: 71 IN | WEIGHT: 279.31 LBS | DIASTOLIC BLOOD PRESSURE: 80 MMHG

## 2024-02-21 DIAGNOSIS — Z72.0 TOBACCO DIPPER: Chronic | ICD-10-CM

## 2024-02-21 DIAGNOSIS — R94.4 DECREASED GFR: ICD-10-CM

## 2024-02-21 DIAGNOSIS — R55 NEAR SYNCOPE: Primary | ICD-10-CM

## 2024-02-21 DIAGNOSIS — I34.0 MODERATE MITRAL REGURGITATION: Chronic | ICD-10-CM

## 2024-02-21 DIAGNOSIS — I25.10 MILD CAD: ICD-10-CM

## 2024-02-21 DIAGNOSIS — R94.30 ELEVATED LEFT VENTRICULAR END-DIASTOLIC PRESSURE (LVEDP): ICD-10-CM

## 2024-02-21 DIAGNOSIS — I10 PRIMARY HYPERTENSION: Chronic | ICD-10-CM

## 2024-02-21 DIAGNOSIS — G47.33 OSA ON CPAP: ICD-10-CM

## 2024-02-21 DIAGNOSIS — E66.01 SEVERE OBESITY (BMI 35.0-39.9) WITH COMORBIDITY: Chronic | ICD-10-CM

## 2024-02-21 PROCEDURE — 99214 OFFICE O/P EST MOD 30 MIN: CPT | Mod: S$GLB,,, | Performed by: INTERNAL MEDICINE

## 2024-02-21 RX ORDER — SPIRONOLACTONE 25 MG/1
12.5 TABLET ORAL DAILY
Qty: 30 TABLET | Refills: 2 | Status: SHIPPED | OUTPATIENT
Start: 2024-02-21

## 2024-02-21 NOTE — PROGRESS NOTES
Subjective:    Patient ID:  Bryant Corona is a 53 y.o. male who presents for Hypertension        HPI  STATUS POST LEFT HEART CATHETERIZATION MINIMAL NONOBSTRUCTIVE CAD LVEDP 32 WAS REFER TO ENT DIAGNOSED WITH SCHWANNOMA OF NERVE OF NECK, HAD FAILT FEELING AND FEELING BAD WHILE DRIVING, BETTER AFTER HE ATE, DID NOT EAT ALL DAY, GFR 55, NOT USING CPAP REGULARLY, SEE ROS    Past Medical History:   Diagnosis Date    Anxiety     Back pain     Depression     Gout, unspecified     Hyperlipidemia     Hypertension     Sleep apnea     uses cpap     Past Surgical History:   Procedure Laterality Date    ANGIOGRAM, CORONARY, WITH LEFT HEART CATHETERIZATION  12/5/2023    Procedure: Left Heart Cath;  Surgeon: Ced Teran MD;  [facility];  Service: Cardiology;;    CORONARY ANGIOGRAPHY  12/5/2023    Procedure: Coronary angiogram study;  Surgeon: Ced Teran MD;  Location: [facility] CATH;  Service: Cardiology;;    left knee surgery      TIBIA FRACTURE SURGERY Right      No family history on file.  Social History     Socioeconomic History    Marital status:    Tobacco Use    Smoking status: Some Days     Types: Cigarettes    Smokeless tobacco: Never    Tobacco comments:     Very seldom   Substance and Sexual Activity    Alcohol use: Yes     Comment: seldom    Drug use: Never       Review of patient's allergies indicates:   Allergen Reactions    Codeine Itching       Current Outpatient Medications:     allopurinoL (ZYLOPRIM) 300 MG tablet, Take 1 tablet (300 mg total) by mouth once daily., Disp: 90 tablet, Rfl: 3    indomethacin (INDOCIN) 50 MG capsule, Take 1 capsule (50 mg total) by mouth 3 (three) times daily as needed (acute gout pain)., Disp: 30 capsule, Rfl: 1    ALPRAZolam (XANAX) 0.5 MG tablet, Take 1 tablet (0.5 mg total) by mouth 2 (two) times daily as needed for Anxiety., Disp: 60 tablet, Rfl: 2    coenzyme Q10 (CO Q-10) 100 mg capsule, Take 1 capsule (100 mg total) by mouth once daily., Disp: 90 capsule,  Rfl: 3    ergocalciferol (ERGOCALCIFEROL) 50,000 unit Cap, Take 1 capsule (50,000 Units total) by mouth every 7 days., Disp: 12 capsule, Rfl: 3    FLUoxetine 40 MG capsule, Take 1 capsule (40 mg total) by mouth once daily., Disp: 90 capsule, Rfl: 3    losartan (COZAAR) 50 MG tablet, Take 1 tablet (50 mg total) by mouth once daily., Disp: 90 tablet, Rfl: 3    rosuvastatin (CRESTOR) 20 MG tablet, Take 1 tablet (20 mg total) by mouth once daily., Disp: 90 tablet, Rfl: 3    spironolactone (ALDACTONE) 25 MG tablet, Take 0.5 tablets (12.5 mg total) by mouth once daily., Disp: 30 tablet, Rfl: 2    testosterone cypionate (DEPO-TESTOSTERONE) 200 mg/mL injection, Inject 1 mL (200 mg total) into the muscle every 14 (fourteen) days., Disp: 10 mL, Rfl: 1    Review of Systems   Constitutional: Positive for malaise/fatigue. Negative for chills, diaphoresis, night sweats and weight loss.   HENT:  Negative for congestion and nosebleeds.    Eyes:  Negative for blurred vision and visual disturbance.   Cardiovascular:  Positive for dyspnea on exertion and near-syncope. Negative for chest pain, claudication, cyanosis, irregular heartbeat, leg swelling (RLE), orthopnea, palpitations, paroxysmal nocturnal dyspnea and syncope.   Respiratory:  Positive for shortness of breath. Negative for cough, hemoptysis and wheezing.    Endocrine: Negative for cold intolerance, heat intolerance and polyuria.   Hematologic/Lymphatic: Negative for adenopathy. Does not bruise/bleed easily.   Skin:  Negative for color change, itching and nail changes.   Musculoskeletal:  Positive for back pain, joint pain and neck pain. Negative for falls.   Gastrointestinal:  Negative for abdominal pain, change in bowel habit, dysphagia, jaundice, melena and nausea.   Genitourinary:  Negative for dysuria and flank pain.        TESTICULAR MASS THAT HE HAD FOR YEARS   Neurological:  Negative for brief paralysis, focal weakness, light-headedness, loss of balance and weakness.  "Headaches: H/O HEAD TRAUMA WITH A PISTOL. Numbness: R HAND.  Psychiatric/Behavioral:  Negative for altered mental status and depression.    Allergic/Immunologic: Negative for persistent infections.        Objective:      Vitals:    02/21/24 1315 02/21/24 1356   BP: (!) 174/95 138/80   Pulse: 83    Weight: 126.7 kg (279 lb 5.2 oz)    Height: 5' 11" (1.803 m)      Body mass index is 38.96 kg/m².    Physical Exam  Constitutional:       Appearance: Normal appearance. He is morbidly obese.   Neck:      Vascular: Normal carotid pulses. No hepatojugular reflux or JVD.   Cardiovascular:      Rate and Rhythm: No extrasystoles are present.     Pulses:           Carotid pulses are 2+ on the right side and 2+ on the left side.       Radial pulses are 2+ on the right side and 2+ on the left side.        Femoral pulses are 2+ on the right side and 2+ on the left side.       Posterior tibial pulses are 2+ on the right side and 2+ on the left side.      Heart sounds: Murmur heard.      Systolic murmur is present with a grade of 2/6 at the lower left sternal border.      No friction rub. No S4 sounds.   Pulmonary:      Effort: Pulmonary effort is normal. No respiratory distress.      Breath sounds: Normal breath sounds and air entry.   Chest:      Chest wall: Tenderness present.       Musculoskeletal:      Cervical back: Neck supple.      Right lower leg: No edema.      Left lower leg: No edema.   Neurological:      Mental Status: He is alert and oriented to person, place, and time.      Cranial Nerves: Cranial nerves 2-12 are intact.   Psychiatric:         Mood and Affect: Mood normal.         Speech: Speech normal.         Behavior: Behavior normal.               ..    Chemistry        Component Value Date/Time     02/22/2024 1633    K 4.3 02/22/2024 1633     02/22/2024 1633    CO2 24 02/22/2024 1633    BUN 16 02/22/2024 1633    CREATININE 1.0 02/22/2024 1633    GLU 94 02/22/2024 1633        Component Value Date/Time "    CALCIUM 9.9 02/22/2024 1633    ALKPHOS 87 02/22/2024 1633    AST 17 02/22/2024 1633    ALT 24 02/22/2024 1633    BILITOT 0.7 02/22/2024 1633            ..  Lab Results   Component Value Date    CHOL 210 (H) 02/22/2024    CHOL 184 10/16/2023    CHOL 176 03/11/2023     Lab Results   Component Value Date    HDL 38 (L) 02/22/2024    HDL 30 (L) 10/16/2023    HDL 31 (L) 03/11/2023     Lab Results   Component Value Date    LDLCALC 117.6 02/22/2024    LDLCALC 119.2 10/16/2023    LDLCALC 110 03/11/2023     Lab Results   Component Value Date    TRIG 272 (H) 02/22/2024    TRIG 174 (H) 10/16/2023    TRIG 176 (H) 03/11/2023     Lab Results   Component Value Date    CHOLHDL 18.1 (L) 02/22/2024    CHOLHDL 16.3 (L) 10/16/2023    CHOLHDL 19.0 (L) 03/01/2023     ..  Lab Results   Component Value Date    WBC 13.80 (H) 02/19/2024    HGB 15.6 02/19/2024    HCT 46.3 02/19/2024    MCV 91 02/19/2024     02/19/2024       Test(s) Reviewed  I have reviewed the following in detail:  [] Stress test   [x] Angiography   [] Echocardiogram   [x] Labs   [x] Other:       Assessment:         ICD-10-CM ICD-9-CM   1. Near syncope  R55 780.2   2. Elevated left ventricular end-diastolic pressure (LVEDP)  R94.30 794.30   3. Decreased GFR  R94.4 794.4   4. Severe obesity (BMI 35.0-39.9) with comorbidity  E66.01 278.01   5. LOLA on CPAP  G47.33 327.23   6. Moderate mitral regurgitation  I34.0 424.0   7. Primary hypertension  I10 401.9   8. Mild CAD  I25.10 414.00   9.   Z72.0 305.1     Problem List Items Addressed This Visit          Cardiac/Vascular    Hypertension    Relevant Orders    Comprehensive Metabolic Panel    Moderate mitral regurgitation    Elevated left ventricular end-diastolic pressure (LVEDP)    Relevant Orders    Comprehensive Metabolic Panel    Mild CAD       Renal/    Decreased GFR    Relevant Orders    Comprehensive Metabolic Panel       Endocrine    Severe obesity (BMI 35.0-39.9) with comorbidity       Other     LOLA on CPAP        Near syncope - Primary    Relevant Orders    Holter monitor - 72 hour    Comprehensive Metabolic Panel        Plan:     TOBACCO CESSATION, COUNSELING USE CPAP, COMPLAINCE WITH INSTRUCTION EXPLAINED RISK, ADD SPIRONOLACTONE, 72 H HOLTER, SUSPECT POSSIBLE HYPOGLYCEMIA SMALL FREQUENT MEALS NEEDS SIGNIFICANT WEIGHT LOSS DIET EXERCISE DISCUSSED PLAN WITH THE PATIENT AND HIS WIFE AVOID DEHYDRATION RETURN TO CLINIC AFTER FEW WEEKS      Near syncope  -     Holter monitor - 72 hour; Future  -     Comprehensive Metabolic Panel; Future; Expected date: 05/21/2024    Elevated left ventricular end-diastolic pressure (LVEDP)  -     Comprehensive Metabolic Panel; Future; Expected date: 05/21/2024    Decreased GFR  -     Comprehensive Metabolic Panel; Future; Expected date: 05/21/2024    Severe obesity (BMI 35.0-39.9) with comorbidity    LOLA on CPAP    Moderate mitral regurgitation    Primary hypertension  -     Comprehensive Metabolic Panel; Future; Expected date: 05/21/2024    Mild CAD        Other orders  -     spironolactone (ALDACTONE) 25 MG tablet; Take 0.5 tablets (12.5 mg total) by mouth once daily.  Dispense: 30 tablet; Refill: 2    RTC Low level/low impact aerobic exercise 5x's/wk. Heart healthy diet and risk factor modification.    See labs and med orders.    Aerobic exercise 5x's/wk. Heart healthy diet and risk factor modification.    See labs and med orders.

## 2024-02-22 ENCOUNTER — OFFICE VISIT (OUTPATIENT)
Dept: PRIMARY CARE CLINIC | Facility: CLINIC | Age: 54
End: 2024-02-22
Payer: MEDICARE

## 2024-02-22 VITALS
HEIGHT: 71 IN | OXYGEN SATURATION: 96 % | SYSTOLIC BLOOD PRESSURE: 136 MMHG | DIASTOLIC BLOOD PRESSURE: 88 MMHG | WEIGHT: 281.5 LBS | BODY MASS INDEX: 39.41 KG/M2 | HEART RATE: 102 BPM

## 2024-02-22 DIAGNOSIS — Z20.2 STD EXPOSURE: ICD-10-CM

## 2024-02-22 DIAGNOSIS — I10 HYPERTENSION, UNSPECIFIED TYPE: Primary | ICD-10-CM

## 2024-02-22 DIAGNOSIS — E78.49 OTHER HYPERLIPIDEMIA: ICD-10-CM

## 2024-02-22 DIAGNOSIS — M10.9 GOUT, UNSPECIFIED CAUSE, UNSPECIFIED CHRONICITY, UNSPECIFIED SITE: ICD-10-CM

## 2024-02-22 DIAGNOSIS — R30.0 DYSURIA: ICD-10-CM

## 2024-02-22 DIAGNOSIS — Z12.5 SCREENING FOR MALIGNANT NEOPLASM OF PROSTATE: ICD-10-CM

## 2024-02-22 DIAGNOSIS — R73.03 PRE-DIABETES: ICD-10-CM

## 2024-02-22 DIAGNOSIS — Z12.11 SCREENING FOR MALIGNANT NEOPLASM OF COLON: ICD-10-CM

## 2024-02-22 PROBLEM — R06.02 SOB (SHORTNESS OF BREATH): Status: RESOLVED | Noted: 2023-10-18 | Resolved: 2024-02-22

## 2024-02-22 LAB
ALBUMIN SERPL BCP-MCNC: 4.2 G/DL (ref 3.5–5.2)
ALP SERPL-CCNC: 87 U/L (ref 55–135)
ALT SERPL W/O P-5'-P-CCNC: 24 U/L (ref 10–44)
ANION GAP SERPL CALC-SCNC: 10 MMOL/L (ref 8–16)
AST SERPL-CCNC: 17 U/L (ref 10–40)
BILIRUB SERPL-MCNC: 0.7 MG/DL (ref 0.1–1)
BILIRUB UR QL STRIP: NEGATIVE
BUN SERPL-MCNC: 16 MG/DL (ref 6–20)
CALCIUM SERPL-MCNC: 9.9 MG/DL (ref 8.7–10.5)
CHLORIDE SERPL-SCNC: 103 MMOL/L (ref 95–110)
CHOLEST SERPL-MCNC: 210 MG/DL (ref 120–199)
CHOLEST/HDLC SERPL: 5.5 {RATIO} (ref 2–5)
CLARITY UR REFRACT.AUTO: CLEAR
CO2 SERPL-SCNC: 24 MMOL/L (ref 23–29)
COLOR UR AUTO: YELLOW
CREAT SERPL-MCNC: 1 MG/DL (ref 0.5–1.4)
EST. GFR  (NO RACE VARIABLE): >60 ML/MIN/1.73 M^2
ESTIMATED AVG GLUCOSE: 126 MG/DL (ref 68–131)
GLUCOSE SERPL-MCNC: 94 MG/DL (ref 70–110)
GLUCOSE UR QL STRIP: NEGATIVE
HBA1C MFR BLD: 6 % (ref 4–5.6)
HBV SURFACE AG SERPL QL IA: NORMAL
HDLC SERPL-MCNC: 38 MG/DL (ref 40–75)
HDLC SERPL: 18.1 % (ref 20–50)
HGB UR QL STRIP: NEGATIVE
HIV 1+2 AB+HIV1 P24 AG SERPL QL IA: NORMAL
KETONES UR QL STRIP: NEGATIVE
LDLC SERPL CALC-MCNC: 117.6 MG/DL (ref 63–159)
LEUKOCYTE ESTERASE UR QL STRIP: ABNORMAL
MICROSCOPIC COMMENT: ABNORMAL
NITRITE UR QL STRIP: NEGATIVE
NONHDLC SERPL-MCNC: 172 MG/DL
PH UR STRIP: 6 [PH] (ref 5–8)
POTASSIUM SERPL-SCNC: 4.3 MMOL/L (ref 3.5–5.1)
PROT SERPL-MCNC: 7.6 G/DL (ref 6–8.4)
PROT UR QL STRIP: NEGATIVE
RBC #/AREA URNS AUTO: 7 /HPF (ref 0–4)
SODIUM SERPL-SCNC: 137 MMOL/L (ref 136–145)
SP GR UR STRIP: 1.01 (ref 1–1.03)
SQUAMOUS #/AREA URNS AUTO: 1 /HPF
TRIGL SERPL-MCNC: 272 MG/DL (ref 30–150)
URATE SERPL-MCNC: 10.8 MG/DL (ref 3.4–7)
URN SPEC COLLECT METH UR: ABNORMAL
WBC #/AREA URNS AUTO: 27 /HPF (ref 0–5)
YEAST UR QL AUTO: ABNORMAL

## 2024-02-22 PROCEDURE — 36415 COLL VENOUS BLD VENIPUNCTURE: CPT | Mod: S$GLB,,, | Performed by: INTERNAL MEDICINE

## 2024-02-22 PROCEDURE — 80053 COMPREHEN METABOLIC PANEL: CPT | Performed by: PHYSICIAN ASSISTANT

## 2024-02-22 PROCEDURE — 80061 LIPID PANEL: CPT | Performed by: PHYSICIAN ASSISTANT

## 2024-02-22 PROCEDURE — 84550 ASSAY OF BLOOD/URIC ACID: CPT | Performed by: PHYSICIAN ASSISTANT

## 2024-02-22 PROCEDURE — 87086 URINE CULTURE/COLONY COUNT: CPT | Performed by: PHYSICIAN ASSISTANT

## 2024-02-22 PROCEDURE — 86592 SYPHILIS TEST NON-TREP QUAL: CPT | Performed by: PHYSICIAN ASSISTANT

## 2024-02-22 PROCEDURE — 87389 HIV-1 AG W/HIV-1&-2 AB AG IA: CPT | Performed by: PHYSICIAN ASSISTANT

## 2024-02-22 PROCEDURE — 99214 OFFICE O/P EST MOD 30 MIN: CPT | Mod: S$GLB,,, | Performed by: PHYSICIAN ASSISTANT

## 2024-02-22 PROCEDURE — 87491 CHLMYD TRACH DNA AMP PROBE: CPT | Performed by: PHYSICIAN ASSISTANT

## 2024-02-22 PROCEDURE — 83036 HEMOGLOBIN GLYCOSYLATED A1C: CPT | Performed by: PHYSICIAN ASSISTANT

## 2024-02-22 PROCEDURE — 87340 HEPATITIS B SURFACE AG IA: CPT | Performed by: PHYSICIAN ASSISTANT

## 2024-02-22 PROCEDURE — 81001 URINALYSIS AUTO W/SCOPE: CPT | Performed by: PHYSICIAN ASSISTANT

## 2024-02-22 RX ORDER — METRONIDAZOLE 500 MG/1
2000 TABLET ORAL ONCE
Qty: 4 TABLET | Refills: 0 | Status: SHIPPED | OUTPATIENT
Start: 2024-02-22 | End: 2024-02-22

## 2024-02-22 RX ORDER — ROSUVASTATIN CALCIUM 20 MG/1
20 TABLET, COATED ORAL DAILY
Qty: 90 TABLET | Refills: 3 | Status: SHIPPED | OUTPATIENT
Start: 2024-02-22 | End: 2025-02-21

## 2024-02-22 RX ORDER — LOSARTAN POTASSIUM 50 MG/1
50 TABLET ORAL DAILY
Qty: 90 TABLET | Refills: 3 | Status: SHIPPED | OUTPATIENT
Start: 2024-02-22

## 2024-02-22 RX ORDER — ALPRAZOLAM 0.5 MG/1
0.5 TABLET ORAL 2 TIMES DAILY PRN
Qty: 60 TABLET | Refills: 2 | Status: SHIPPED | OUTPATIENT
Start: 2024-02-22 | End: 2024-05-22

## 2024-02-22 RX ORDER — EPINEPHRINE 0.22MG
100 AEROSOL WITH ADAPTER (ML) INHALATION DAILY
Qty: 90 CAPSULE | Refills: 3 | Status: SHIPPED | OUTPATIENT
Start: 2024-02-22 | End: 2025-02-21

## 2024-02-22 RX ORDER — FLUOXETINE HYDROCHLORIDE 40 MG/1
40 CAPSULE ORAL DAILY
Qty: 90 CAPSULE | Refills: 3 | Status: SHIPPED | OUTPATIENT
Start: 2024-02-22

## 2024-02-22 RX ORDER — ERGOCALCIFEROL 1.25 MG/1
50000 CAPSULE ORAL
Qty: 12 CAPSULE | Refills: 3 | Status: SHIPPED | OUTPATIENT
Start: 2024-02-22

## 2024-02-22 RX ORDER — TESTOSTERONE CYPIONATE 200 MG/ML
200 INJECTION, SOLUTION INTRAMUSCULAR
Qty: 10 ML | Refills: 1 | Status: SHIPPED | OUTPATIENT
Start: 2024-02-22 | End: 2024-08-22

## 2024-02-22 NOTE — PROGRESS NOTES
Subjective     Patient ID: Bryant Corona is a 53 y.o. male.    Chief Complaint: Follow-up    Exposure to STD  This is a new problem. The current episode started in the past 7 days. The problem occurs constantly. The problem has been waxing and waning. The patient is experiencing no pain. Pertinent negatives include no abdominal pain, chest pain, headaches or shortness of breath. Nothing aggravates the symptoms. He has tried nothing for the symptoms. The treatment provided no relief. He is sexually active. It is unknown whether or not his partner has an STD.   Hypertension  This is a chronic problem. The current episode started more than 1 year ago. The problem has been waxing and waning since onset. The problem is controlled. Pertinent negatives include no blurred vision, chest pain, headaches or shortness of breath. There are no associated agents to hypertension. Risk factors for coronary artery disease include dyslipidemia, family history, male gender, obesity and stress. Past treatments include ACE inhibitors and diuretics. The current treatment provides moderate improvement.   Diabetes  Diabetes type: prediabetes. There are no hypoglycemic associated symptoms. Pertinent negatives for hypoglycemia include no dizziness or headaches. Pertinent negatives for diabetes include no blurred vision, no chest pain, no fatigue, no foot paresthesias, no foot ulcerations, no polydipsia, no polyphagia, no polyuria, no visual change, no weakness and no weight loss. Symptoms are stable. There are no diabetic complications. Risk factors for coronary artery disease include dyslipidemia, family history, male sex, obesity, hypertension and stress. Current diabetic treatment includes diet. He is compliant with treatment most of the time.     Past Medical History:   Diagnosis Date    Anxiety     Back pain     Depression     Gout, unspecified     Hyperlipidemia     Hypertension     Sleep apnea     uses cpap       Review of Systems    Constitutional:  Negative for fatigue and weight loss.   Eyes:  Negative for blurred vision.   Respiratory:  Negative for chest tightness and shortness of breath.    Cardiovascular:  Negative for chest pain.   Gastrointestinal:  Negative for abdominal pain.   Endocrine: Negative for polydipsia, polyphagia and polyuria.   Genitourinary: Negative.    Neurological:  Negative for dizziness, weakness, numbness and headaches.          Objective     Physical Exam  Vitals reviewed.   Constitutional:       General: He is not in acute distress.     Appearance: Normal appearance. He is not ill-appearing, toxic-appearing or diaphoretic.   Cardiovascular:      Rate and Rhythm: Normal rate.      Pulses: Normal pulses.      Heart sounds: Normal heart sounds. No murmur heard.     No friction rub. No gallop.   Pulmonary:      Effort: Pulmonary effort is normal. No respiratory distress.      Breath sounds: Normal breath sounds. No stridor. No wheezing, rhonchi or rales.   Chest:      Chest wall: No tenderness.   Abdominal:      Palpations: Abdomen is soft.      Tenderness: There is no abdominal tenderness.   Musculoskeletal:      Cervical back: No rigidity or tenderness.   Lymphadenopathy:      Cervical: No cervical adenopathy.   Neurological:      Mental Status: He is alert.            Assessment and Plan     1. Hypertension, unspecified type  -     Comprehensive Metabolic Panel; Future; Expected date: 02/22/2024    2. Pre-diabetes  -     Hemoglobin A1C; Future; Expected date: 02/22/2024    3. Other hyperlipidemia  -     Lipid Panel; Future; Expected date: 02/22/2024  -     rosuvastatin (CRESTOR) 20 MG tablet; Take 1 tablet (20 mg total) by mouth once daily.  Dispense: 90 tablet; Refill: 3    4. Gout, unspecified cause, unspecified chronicity, unspecified site  -     Uric Acid; Future; Expected date: 02/22/2024    5. Screening for malignant neoplasm of colon    6. Screening for malignant neoplasm of prostate    7. Dysuria  -      C. trachomatis/N. gonorrhoeae by AMP DNA  -     Urine culture  -     Urinalysis    8. STD exposure  -     HIV 1/2 Ag/Ab (4th Gen); Future; Expected date: 02/22/2024  -     RPR; Future; Expected date: 02/22/2024  -     HEPATITIS B SURFACE ANTIGEN; Future; Expected date: 02/22/2024    Other orders  -     metroNIDAZOLE (FLAGYL) 500 MG tablet; Take 4 tablets (2,000 mg total) by mouth once. for 1 dose  Dispense: 4 tablet; Refill: 0  -     testosterone cypionate (DEPO-TESTOSTERONE) 200 mg/mL injection; Inject 1 mL (200 mg total) into the muscle every 14 (fourteen) days.  Dispense: 10 mL; Refill: 1  -     coenzyme Q10 (CO Q-10) 100 mg capsule; Take 1 capsule (100 mg total) by mouth once daily.  Dispense: 90 capsule; Refill: 3  -     losartan (COZAAR) 50 MG tablet; Take 1 tablet (50 mg total) by mouth once daily.  Dispense: 90 tablet; Refill: 3  -     FLUoxetine 40 MG capsule; Take 1 capsule (40 mg total) by mouth once daily.  Dispense: 90 capsule; Refill: 3  -     ergocalciferol (ERGOCALCIFEROL) 50,000 unit Cap; Take 1 capsule (50,000 Units total) by mouth every 7 days.  Dispense: 12 capsule; Refill: 3  -     ALPRAZolam (XANAX) 0.5 MG tablet; Take 1 tablet (0.5 mg total) by mouth 2 (two) times daily as needed for Anxiety.  Dispense: 60 tablet; Refill: 2        I spent 30 minutes on this encounter, time includes face-to-face, chart review, documentation, test review and orders.

## 2024-02-23 LAB
BACTERIA UR CULT: NORMAL
RPR SER QL: NORMAL

## 2024-02-26 ENCOUNTER — TELEPHONE (OUTPATIENT)
Dept: PRIMARY CARE CLINIC | Facility: CLINIC | Age: 54
End: 2024-02-26
Payer: MEDICARE

## 2024-02-26 RX ORDER — METFORMIN HYDROCHLORIDE 500 MG/1
500 TABLET, EXTENDED RELEASE ORAL
Qty: 90 TABLET | Refills: 3 | Status: SHIPPED | OUTPATIENT
Start: 2024-02-26 | End: 2025-02-25

## 2024-02-26 RX ORDER — ALLOPURINOL 300 MG/1
300 TABLET ORAL DAILY
Qty: 90 TABLET | Refills: 3 | Status: SHIPPED | OUTPATIENT
Start: 2024-02-26

## 2024-02-26 NOTE — TELEPHONE ENCOUNTER
----- Message from Miguel A Jacobo sent at 2/24/2024 11:58 PM CST -----  Regarding: Client Services  Contact: [phone]  Good Morning,     My name is Migeul A Jacobo, I work in the Lab Client [facility]. We had a problem with some lab work on this patient. If someone from your office could call us at [phone] or [phone] that would be great. Anyone in my department can help.      Thank you,

## 2024-02-26 NOTE — TELEPHONE ENCOUNTER
----- Message from Kirby Laboy sent at 2/26/2024  1:59 PM CST -----  Contact: pt  Type:  Patient Returning Call    Who Called: the patient  Who Left Message for Patient:SPENSER PASCAL  Does the patient know what this is regarding?:test results  Would the patient rather a call back or a response via [facility]? call back   Best Call Back Number:[phone]   Additional Information: Thanks         Speaking Coherently

## 2024-02-26 NOTE — TELEPHONE ENCOUNTER
Spoke with [facility] Client Services (MsMaura Krunal) and she stated that pt STD testing couldn't not be done due to insufficient amount of urine. It has to be 7 mL. Will notify pt to redo . No other concerns were voiced

## 2024-02-27 ENCOUNTER — TELEPHONE (OUTPATIENT)
Dept: PRIMARY CARE CLINIC | Facility: CLINIC | Age: 54
End: 2024-02-27
Payer: MEDICARE

## 2024-06-18 ENCOUNTER — TELEPHONE (OUTPATIENT)
Dept: PRIMARY CARE CLINIC | Facility: CLINIC | Age: 54
End: 2024-06-18

## 2024-06-18 NOTE — TELEPHONE ENCOUNTER
Returned pt call regarding pain management referral request and pt said he will request at a later date

## 2024-06-18 NOTE — TELEPHONE ENCOUNTER
----- Message from Leah Mac sent at 6/18/2024  2:03 PM CDT -----  Contact: pt [phone]  Type:  Same Day Appointment Request    Caller is requesting a same day appointment.  Caller declined first available appointment listed below.      Name of Caller:  Pt   When is the first available appointment?  06/20  Symptoms:  Referral for pain management   Best Call Back Number:  [phone]    Additional Information:   Pls call back and advise

## 2024-07-18 ENCOUNTER — OFFICE VISIT (OUTPATIENT)
Dept: PAIN MEDICINE | Facility: CLINIC | Age: 54
End: 2024-07-18
Payer: MEDICARE

## 2024-07-18 VITALS
SYSTOLIC BLOOD PRESSURE: 178 MMHG | BODY MASS INDEX: 40.15 KG/M2 | WEIGHT: 286.81 LBS | HEART RATE: 93 BPM | DIASTOLIC BLOOD PRESSURE: 94 MMHG | HEIGHT: 71 IN

## 2024-07-18 DIAGNOSIS — G56.01 CARPAL TUNNEL SYNDROME OF RIGHT WRIST: ICD-10-CM

## 2024-07-18 DIAGNOSIS — M54.2 NECK PAIN: ICD-10-CM

## 2024-07-18 DIAGNOSIS — M54.12 CERVICAL RADICULOPATHY: Primary | ICD-10-CM

## 2024-07-18 PROCEDURE — 99213 OFFICE O/P EST LOW 20 MIN: CPT | Mod: PBBFAC,PO | Performed by: ANESTHESIOLOGY

## 2024-07-18 PROCEDURE — 99999 PR PBB SHADOW E&M-EST. PATIENT-LVL III: CPT | Mod: PBBFAC,,, | Performed by: ANESTHESIOLOGY

## 2024-07-18 PROCEDURE — 99203 OFFICE O/P NEW LOW 30 MIN: CPT | Mod: S$PBB,,, | Performed by: ANESTHESIOLOGY

## 2024-07-18 NOTE — PROGRESS NOTES
[facility] New Patient Evaluation      Referred by: William Graham III    PCP:     CC:   Chief Complaint   Patient presents with    Back Pain     Pain from the neck to the feet.           7/18/2024     1:10 PM   Last 3 PDI Scores   Pain Disability Index (PDI) 50         HPI:   Bryant Corona is a 53 y.o. male patient who has a past medical history of Anxiety, Back pain, Depression, Gout, unspecified, Hyperlipidemia, Hypertension, and Sleep apnea. He presents with neck pain.  This has been bothering him for over the past 10 years.  Today he reports neck pain that can radiate into his shoulders and towards his armpits.  He can tingling in primarily the right hand.  Today he rates his pain as 10 in 10, constant, aching, sharp, throbbing.  His pain is worse with standing, bending, walking, lifting.  He finds some relief when he gets into a swimming pool.      Pain Intervention History:      Past Spine Surgical History:      Past and current medications:  Antineuropathics:  NSAIDs:  Physical therapy:  Antidepressants:  Muscle relaxers:  Opioids:  Antiplatelets/Anticoagulants:    History:    Current Outpatient Medications:     allopurinoL (ZYLOPRIM) 300 MG tablet, Take 1 tablet (300 mg total) by mouth once daily., Disp: 90 tablet, Rfl: 3    coenzyme Q10 (CO Q-10) 100 mg capsule, Take 1 capsule (100 mg total) by mouth once daily., Disp: 90 capsule, Rfl: 3    ergocalciferol (ERGOCALCIFEROL) 50,000 unit Cap, Take 1 capsule (50,000 Units total) by mouth every 7 days., Disp: 12 capsule, Rfl: 3    FLUoxetine 40 MG capsule, Take 1 capsule (40 mg total) by mouth once daily., Disp: 90 capsule, Rfl: 3    indomethacin (INDOCIN) 50 MG capsule, Take 1 capsule (50 mg total) by mouth 3 (three) times daily as needed (acute gout pain)., Disp: 30 capsule, Rfl: 1    losartan (COZAAR) 50 MG tablet, Take 1 tablet (50 mg total) by mouth once daily., Disp: 90 tablet, Rfl: 3    metFORMIN (GLUCOPHAGE-XR) 500 MG ER 24hr tablet, Take 1  tablet (500 mg total) by mouth daily with breakfast., Disp: 90 tablet, Rfl: 3    rosuvastatin (CRESTOR) 20 MG tablet, Take 1 tablet (20 mg total) by mouth once daily., Disp: 90 tablet, Rfl: 3    spironolactone (ALDACTONE) 25 MG tablet, Take 0.5 tablets (12.5 mg total) by mouth once daily., Disp: 30 tablet, Rfl: 2    testosterone cypionate (DEPO-TESTOSTERONE) 200 mg/mL injection, Inject 1 mL (200 mg total) into the muscle every 14 (fourteen) days., Disp: 10 mL, Rfl: 1    ALPRAZolam (XANAX) 0.5 MG tablet, Take 1 tablet (0.5 mg total) by mouth 2 (two) times daily as needed for Anxiety., Disp: 60 tablet, Rfl: 2    Past Medical History:   Diagnosis Date    Anxiety     Back pain     Depression     Gout, unspecified     Hyperlipidemia     Hypertension     Sleep apnea     uses cpap       Past Surgical History:   Procedure Laterality Date    ANGIOGRAM, CORONARY, WITH LEFT HEART CATHETERIZATION  12/5/2023    Procedure: Left Heart Cath;  Surgeon: Ced Teran MD;  Location: [facility] CATH;  Service: Cardiology;;    CORONARY ANGIOGRAPHY  12/5/2023    Procedure: Coronary angiogram study;  Surgeon: Ced Teran MD;  Location: [facility] CATH;  Service: Cardiology;;    left knee surgery      TIBIA FRACTURE SURGERY Right        No family history on file.    Social History     Socioeconomic History    Marital status:    Tobacco Use    Smoking status: Some Days     Types: Cigarettes    Smokeless tobacco: Never    Tobacco comments:     Very seldom   Substance and Sexual Activity    Alcohol use: Yes     Comment: seldom    Drug use: Never     Social Determinants of Health     Financial Resource Strain: Low Risk  (3/10/2023)    [facility], [facility] and [facility]    Overall Financial Resource Strain (CARDIA)     Difficulty of Paying Living Expenses: Not hard at all   Food Insecurity:  "Patient Declined (3/10/2023)    [facility], [facility]    Hunger Vital Sign     Worried About Running Out of Food in the Last Year: Patient declined     Ran Out of Food in the Last Year: Patient declined   Transportation Needs: No Transportation Needs (3/10/2023)    Received from [facility], [facility]    PRAPARE - Transportation     Lack of Transportation (Medical): No     Lack of Transportation (Non-Medical): No   Stress: Stress Concern Present (3/10/2023)    Received from [facility], [facility]    [facility] Occupational [facility] - Occupational Stress Questionnaire     Feeling of Stress : Very much   Housing Stability: Unknown (3/10/2023)    Received from [facility], [facility]    Housing Stability Vital Sign     Unable to Pay for Housing in the Last Year: No       Review of patient's allergies indicates:   Allergen Reactions    Codeine Itching       Review of Systems:  12 point review of systems is negative.    Physical Exam:  Vitals:    07/18/24 1309   BP: (!) 178/94   Pulse: 93   Weight: 130.1 kg (286 lb 13.1 oz)   Height: 5' 11" (1.803 m)   PainSc:   9   PainLoc: Back     Body mass index is 40 kg/m².    Gen: NAD  Psych: mood appropriate for given condition  HEENT: eyes anicteric   CV: RRR  HEENT: anicteric   Respiratory: non-labored, no signs of respiratory distress  Abd: non-distended  Skin: warm, " dry and intact.  Gait: No antalgic gait.     Durkan's positive on the right    Sensory:  Intact and symmetrical to light touch in C4-T1 dermatomes bilaterally, except decreased over the distal aspect of the 1st 2nd and 3rd fingers on the right  Intact and symmetrical to light touch in L1-S1 dermatomes bilaterally.    Motor:    Right Left   C4 Shoulder Abduction  5  5   C5 Elbow Flexion    5-  5   C6 Wrist Extension  5  5   C7 Elbow Extension   5  5   C8/T1 Hand Intrinsics   5-  5        Right Left   L2/3 Iliacus Hip flexion  5  5   L3/4 Qudratus Femoris Knee Extension  5  5   L4/5 Tib Anterior Ankle Dorsiflexion   5  5   L5/S1 Extensor Hallicus Longus Great toe extension  5  5   S1/S2 Gastroc/Soleus Plantar Flexion  5  5      Right Left   Triceps DTR 0 0   Biceps DTR 1+ 1+        Patellar DTR 0 2+   Achilles DTR 0 0   Thakkar Absent  Absent                 Labs:  Lab Results   Component Value Date    HGBA1C 6.0 (H) 02/22/2024       Lab Results   Component Value Date    WBC 13.80 (H) 02/19/2024    HGB 15.6 02/19/2024    HCT 46.3 02/19/2024    MCV 91 02/19/2024     02/19/2024           Imaging:  none    Assessment:   Problem List Items Addressed This Visit    None  Visit Diagnoses       Cervical radiculopathy    -  Primary    Neck pain        Carpal tunnel syndrome of right wrist                  Bryant Corona is a 53 y.o. male patient who has a past medical history of Anxiety, Back pain, Depression, Gout, unspecified, Hyperlipidemia, Hypertension, and Sleep apnea. He presents with neck pain.  This has been bothering him for over the past 10 years.  Today he reports neck pain that can radiate into his shoulders and towards his armpits.  He can tingling in primarily the right hand.  Today he rates his pain as 10 in 10, constant, aching, sharp, throbbing.  His pain is worse with standing, bending, walking, lifting.  He finds some relief when he gets into a swimming pool.    - some weakness with right   strength and right elbow flexion.  He has some decreased sensation to light touch over the tips of his 1st 2nd and 3rd digit on the right hand.  Austen's negative.  Absent bilateral triceps DTR.  1+ bilateral biceps.  Durkan's positive on the right  - I discussed he may have a few pain generators.  He may have some narrowing in his cervical spine that is causing right-sided radicular pain.  He also may be suffering from carpal tunnel on the right  - I have recommended an EMG/NCS and also an MRI of the cervical spine.  He declines both.  He is asking for pain medication.  I discussed I don't recommend opioid medications.  We discussed a trial of an anti neuropathic such as gabapentin or Lyrica however he says these do not work.  We discussed interventional procedures and physical therapy and he is not interested in either these options.   He is going to get a 2nd opinion regarding medication management.      : Reviewed     Keshav Phillip M.D.  Interventional Pain Medicine / Anesthesiology    This note was completed with dictation software and grammatical errors may exist.

## 2024-08-29 ENCOUNTER — TELEPHONE (OUTPATIENT)
Dept: CARDIOLOGY | Facility: CLINIC | Age: 54
End: 2024-08-29
Payer: MEDICARE

## 2024-08-29 DIAGNOSIS — I10 HYPERTENSION, UNSPECIFIED TYPE: Primary | ICD-10-CM

## 2024-08-29 RX ORDER — LOSARTAN POTASSIUM 50 MG/1
50 TABLET ORAL 2 TIMES DAILY
Qty: 180 TABLET | Refills: 1 | Status: SHIPPED | OUTPATIENT
Start: 2024-08-29 | End: 2025-08-29

## 2024-08-29 RX ORDER — CARVEDILOL 6.25 MG/1
6.25 TABLET ORAL 2 TIMES DAILY WITH MEALS
Qty: 60 TABLET | Refills: 3 | Status: SHIPPED | OUTPATIENT
Start: 2024-08-29 | End: 2025-08-29

## 2024-08-29 NOTE — TELEPHONE ENCOUNTER
Spoke to pt: he called to tell us the medication that was called in for 100mg the pharmacy cannot get until tomorrow. I advised pt we have not called in any medication. Pt then thought it might have been called in by [place] provider. Informed pt of med changes per Dr Teran (KEEP LOSARTAN AT 50 B.I.D., ADD CARVEDILOL 6.25 MG B.I.D.). Pt asked what to do about the 100mg medication. He is also very agitated because his blood pressure is still in the 150s. Scheduled appt for tomorrow with Marilyn to sort out pt medications and address concerns about blood pressure

## 2024-08-29 NOTE — TELEPHONE ENCOUNTER
Please advise: pt at ED last night at [place] for high /114 when he got there. He says they gave him two injections and also somce Lasix and his blood pressure eventually came down to 139/95 and they discharged him. He says they also ran tests. They would not give him the results of any of the tests saying the cardiologist had to come see them first. Pt is calling [place] to have to records faxed here for you to review.  BP this morning 175/95 so pt took 2 50mg losartan. BP is currently 153/89

## 2024-08-29 NOTE — TELEPHONE ENCOUNTER
----- Message from Oliva Lopez sent at 8/29/2024  9:28 AM CDT -----  Type: Needs Medical Advice  Who Called:  pt  Symptoms (please be specific):  high bp  was in ER last night  ran tests but will only give results to cardiologist  How long has patient had these symptoms:    Pharmacy name and phone #:    Best Call Back Number: [phone]  Additional Information: pt either wants to be seen or he wants to know the results of his tests from ER  Please call to advise

## 2024-09-06 ENCOUNTER — OFFICE VISIT (OUTPATIENT)
Dept: CARDIOLOGY | Facility: CLINIC | Age: 54
End: 2024-09-06
Payer: MEDICARE

## 2024-09-06 ENCOUNTER — LAB VISIT (OUTPATIENT)
Dept: LAB | Facility: HOSPITAL | Age: 54
End: 2024-09-06
Payer: MEDICARE

## 2024-09-06 VITALS
HEIGHT: 71 IN | HEART RATE: 77 BPM | DIASTOLIC BLOOD PRESSURE: 92 MMHG | BODY MASS INDEX: 39.66 KG/M2 | SYSTOLIC BLOOD PRESSURE: 134 MMHG | WEIGHT: 283.31 LBS

## 2024-09-06 DIAGNOSIS — R94.30 ELEVATED LEFT VENTRICULAR END-DIASTOLIC PRESSURE (LVEDP): ICD-10-CM

## 2024-09-06 DIAGNOSIS — I25.10 MILD CAD: Primary | ICD-10-CM

## 2024-09-06 DIAGNOSIS — R59.0 LOCALIZED ENLARGED LYMPH NODES: ICD-10-CM

## 2024-09-06 DIAGNOSIS — G47.33 OSA ON CPAP: ICD-10-CM

## 2024-09-06 DIAGNOSIS — I10 HYPERTENSION, UNSPECIFIED TYPE: ICD-10-CM

## 2024-09-06 DIAGNOSIS — I34.0 MODERATE MITRAL REGURGITATION: ICD-10-CM

## 2024-09-06 DIAGNOSIS — E78.49 OTHER HYPERLIPIDEMIA: ICD-10-CM

## 2024-09-06 DIAGNOSIS — R59.1 LYMPHADENOPATHY OF HEAD AND NECK: ICD-10-CM

## 2024-09-06 PROBLEM — R94.39 ABNORMAL NUCLEAR STRESS TEST: Status: RESOLVED | Noted: 2023-11-15 | Resolved: 2024-09-06

## 2024-09-06 PROBLEM — R94.31 NONSPECIFIC ABNORMAL ELECTROCARDIOGRAM (ECG) (EKG): Status: RESOLVED | Noted: 2023-10-18 | Resolved: 2024-09-06

## 2024-09-06 PROBLEM — R07.2 PRECORDIAL PAIN: Status: RESOLVED | Noted: 2023-10-18 | Resolved: 2024-09-06

## 2024-09-06 LAB
ALBUMIN SERPL BCP-MCNC: 4 G/DL (ref 3.5–5.2)
ALP SERPL-CCNC: 87 U/L (ref 55–135)
ALT SERPL W/O P-5'-P-CCNC: 30 U/L (ref 10–44)
ANION GAP SERPL CALC-SCNC: 12 MMOL/L (ref 8–16)
AST SERPL-CCNC: 22 U/L (ref 10–40)
BILIRUB SERPL-MCNC: 0.7 MG/DL (ref 0.1–1)
BNP SERPL-MCNC: 10 PG/ML (ref 0–99)
BUN SERPL-MCNC: 15 MG/DL (ref 6–20)
CALCIUM SERPL-MCNC: 9.8 MG/DL (ref 8.7–10.5)
CHLORIDE SERPL-SCNC: 102 MMOL/L (ref 95–110)
CO2 SERPL-SCNC: 26 MMOL/L (ref 23–29)
CREAT SERPL-MCNC: 1.3 MG/DL (ref 0.5–1.4)
EST. GFR  (NO RACE VARIABLE): >60 ML/MIN/1.73 M^2
GLUCOSE SERPL-MCNC: 201 MG/DL (ref 70–110)
POTASSIUM SERPL-SCNC: 4.6 MMOL/L (ref 3.5–5.1)
PROT SERPL-MCNC: 7.7 G/DL (ref 6–8.4)
SODIUM SERPL-SCNC: 140 MMOL/L (ref 136–145)

## 2024-09-06 PROCEDURE — 99213 OFFICE O/P EST LOW 20 MIN: CPT | Mod: PBBFAC,PO

## 2024-09-06 PROCEDURE — 99999 PR PBB SHADOW E&M-EST. PATIENT-LVL III: CPT | Mod: PBBFAC,,,

## 2024-09-06 PROCEDURE — 36415 COLL VENOUS BLD VENIPUNCTURE: CPT | Mod: PO

## 2024-09-06 PROCEDURE — 83880 ASSAY OF NATRIURETIC PEPTIDE: CPT

## 2024-09-06 PROCEDURE — 80053 COMPREHEN METABOLIC PANEL: CPT

## 2024-09-06 RX ORDER — TESTOSTERONE CYPIONATE 200 MG/ML
INJECTION, SOLUTION INTRAMUSCULAR
Qty: 5 ML | Refills: 0 | Status: SHIPPED | OUTPATIENT
Start: 2024-09-06

## 2024-09-06 RX ORDER — METOPROLOL SUCCINATE 25 MG/1
25 TABLET, EXTENDED RELEASE ORAL DAILY
Qty: 90 TABLET | Refills: 3 | Status: SHIPPED | OUTPATIENT
Start: 2024-09-06 | End: 2025-09-06

## 2024-09-06 NOTE — PROGRESS NOTES
Subjective:    Patient ID:  Bryant Corona is a 53 y.o. male patient here for evaluation Hyperlipidemia and Hypertension    History of Present Illness:     Mr. Galvez is a 53 year old M who follows with Dr. Teran here today because he went to the ER at [place] several days ago for shortness of breat and elevated blood pressure. He was given IV lasix with much improvement, states he filled up two bedside urinals in 30 minutes with clear urine. Of note, his LVEDP was elevated at 35 mmhg on his angiogram in 12/2023. He continues with GOLDSMITH. Takes 12.5 mg of aldactone currently.     Do not have the records at [place] to review.           Most Recent Echocardiogram Results  Results for orders placed in visit on 10/18/23    Echo    Interpretation Summary    Left Ventricle: The left ventricle is normal in size.  There is mild eccentric hypertrophy. Normal wall motion. There is normal systolic function. Ejection fraction by visual approximation is 70%. There is normal diastolic function.    Right Ventricle: Normal right ventricular cavity size. Systolic function is normal.    Left Atrium: Left atrium is moderately dilated.    Aortic Valve: There is mild aortic valve sclerosis.    Mitral Valve:  There is moderate MR regurgitation with a centrally directed jet.    Tricuspid Valve: There is mild regurgitation.    Pulmonary Artery: The estimated pulmonary artery systolic pressure is 28 mmHg.    IVC/SVC: Normal venous pressure at 3 mmHg.      Most Recent Nuclear Stress Test Results  Results for orders placed during the hospital encounter of 11/09/23    Nuclear Stress - Cardiology Interpreted    Interpretation Summary    Equivocal myocardial perfusion scan.    There is a small sized, reversible perfusion abnormality that is consistent with ischemia in the basal inferolateral wall(s).    There are no other significant perfusion abnormalities.    The gated perfusion images showed an ejection fraction of 68% at rest.    The ECG  portion of the study is negative for ischemia.    There were no arrhythmias during stress.      Most Recent Cardiac PET Stress Test Results  No results found for this or any previous visit.      Most Recent Cardiovascular Angiogram results  Results for orders placed during the hospital encounter of 12/05/23    Cardiac catheterization    Conclusion    The ejection fraction was greater than 60 % by visual estimate.    The left ventricular end diastolic pressure was elevated.    The pre-procedure left ventricular end diastolic pressure was 32.    There was very minimal non-obstructive coronary artery disease..    Medical treatment risk factor modification weight loss, tobacco cessation.    The procedure log was documented by Documenter: Shiva Reyes RN and verified by Ced Teran MD.    Date: 12/6/2023  Time: 5:39 PM      Other Most Recent Cardiology Results  Results for orders placed in visit on 11/09/23    Holter monitor - 72 hour    Interpretation Summary    Patient monitored for 2d 21h, analyzable time was 2d 21h starting on 11/09/2023 02:45 pm.    Primary rhythm was Sinus Rhythm. Average heart rate was 92 bpm, Minimum heart rate was 62 bpm    Very rare isolated PACs burden less than 0.01%.    Very rare isolated PVCs burden less than 0.01%    No atrial fibrillation, no ventricular tachycardia.    No heart block/pauses      REVIEW OF SYSTEMS: As noted in HPI   CARDIOVASCULAR: No recent chest pain, palpitations, arm/neck/jaw pain, or edema.  RESPIRATORY: No recent fever, cough.   : No blood in the urine  GI: No reflux, nausea, vomiting, or blood in stool.   MUSCULOSKELETAL: No falls.   NEURO: No headaches, syncope, or dizziness.  EYES: No sudden changes in vision.     Past Medical History:   Diagnosis Date    Anxiety     Back pain     Depression     Gout, unspecified     Hyperlipidemia     Hypertension     Sleep apnea     uses cpap     Past Surgical History:   Procedure Laterality Date    ANGIOGRAM,  CORONARY, WITH LEFT HEART CATHETERIZATION  12/5/2023    Procedure: Left Heart Cath;  Surgeon: Ced Teran MD;  Location: [facility] CATH;  Service: Cardiology;;    CORONARY ANGIOGRAPHY  12/5/2023    Procedure: Coronary angiogram study;  Surgeon: Ced Teran MD;  Location: [facility] CATH;  Service: Cardiology;;    left knee surgery      TIBIA FRACTURE SURGERY Right      Social History     Tobacco Use    Smoking status: Some Days     Types: Cigarettes    Smokeless tobacco: Never    Tobacco comments:     Very seldom   Substance Use Topics    Alcohol use: Yes     Comment: seldom    Drug use: Never         Objective      Vitals:    09/06/24 1322   BP: (!) 134/92   Pulse: 77       LAST EKG  Results for orders placed or performed during the hospital encounter of 12/05/23   EKG 12-lead    Collection Time: 12/05/23  6:33 AM    Narrative    Test Reason : Z01.810,    Vent. Rate : 076 BPM     Atrial Rate : 076 BPM     P-R Int : 152 ms          QRS Dur : 076 ms      QT Int : 394 ms       P-R-T Axes : 039 -01 067 degrees     QTc Int : 443 ms    Normal sinus rhythm  Normal ECG  When compared with ECG of 29-NOV-2023 10:14,  No significant change was found  Confirmed by Odell Menendez MD ([id number]) on 12/10/2023 6:51:03 PM    Referred By: [email or web address]   SELF           Confirmed By:Odell Menendez MD     LIPIDS - LAST 2   Lab Results   Component Value Date    CHOL 210 (H) 02/22/2024    CHOL 184 10/16/2023    HDL 38 (L) 02/22/2024    HDL 30 (L) 10/16/2023    LDLCALC 117.6 02/22/2024    LDLCALC 119.2 10/16/2023    TRIG 272 (H) 02/22/2024    TRIG 174 (H) 10/16/2023    CHOLHDL 18.1 (L) 02/22/2024    CHOLHDL 16.3 (L) 10/16/2023     CARDIAC PROFILE - LAST 2  Lab Results   Component Value Date    TROPONINI <0.01 03/10/2023    TROPONINI <0.01 03/10/2023      CBC - LAST 2  Lab Results   Component Value Date    WBC 13.80 (H) 02/19/2024    WBC 12.16 12/05/2023    HGB 15.6 02/19/2024    HGB 14.0 12/05/2023    HCT 46.3 02/19/2024    HCT 42.7 12/05/2023     " 02/19/2024     12/05/2023     No results found for: "LABPT", "INR", "APTT"  CHEMISTRY - LAST 2  Lab Results   Component Value Date     02/22/2024     12/05/2023    K 4.3 02/22/2024    K 4.2 12/05/2023    CO2 24 02/22/2024    CO2 27 12/05/2023    BUN 16 02/22/2024    BUN 17 12/05/2023    CREATININE 1.0 02/22/2024    CREATININE 1.5 (H) 02/19/2024    GLU 94 02/22/2024     (H) 12/05/2023    CALCIUM 9.9 02/22/2024    CALCIUM 8.4 12/05/2023    ALBUMIN 4.2 02/22/2024    ALBUMIN 4.2 12/05/2023    ALT 24 02/22/2024    ALT 24 12/05/2023    AST 17 02/22/2024    AST 27 12/05/2023      ENDOCRINE - LAST 2  Lab Results   Component Value Date    HGBA1C 6.0 (H) 02/22/2024    HGBA1C 5.8 (H) 10/16/2023    TSH 1.568 10/16/2023    TSH 1.329 03/11/2023        PHYSICAL EXAM  CONSTITUTIONAL: Well built, well nourished in no apparent distress  NECK: no carotid bruit, no JVD  LUNGS: CTA  CHEST WALL: no tenderness  HEART: regular rate and rhythm, S1, S2 normal, no murmur, click, rub or gallop   ABDOMEN: soft, non-tender; bowel sounds normal; no masses,  no organomegaly  EXTREMITIES: Extremities normal, no edema, no calf tenderness noted  NEURO: AAO X 3    I HAVE REVIEWED :    The vital signs, most recent cardiac testing, and most recent pertinent non-cardiology provider notes.    Current Outpatient Medications   Medication Instructions    allopurinoL (ZYLOPRIM) 300 mg, Oral, Daily    ALPRAZolam (XANAX) 0.5 mg, Oral, 2 times daily PRN    coenzyme Q10 (CO Q-10) 100 mg, Oral, Daily    ergocalciferol (ERGOCALCIFEROL) 50,000 Units, Oral, Every 7 days    FLUoxetine 40 mg, Oral, Daily    indomethacin (INDOCIN) 50 mg, Oral, 3 times daily PRN    losartan (COZAAR) 50 mg, Oral, 2 times daily    metFORMIN (GLUCOPHAGE-XR) 500 mg, Oral, With breakfast    metoprolol succinate (TOPROL-XL) 25 mg, Oral, Daily    rosuvastatin (CRESTOR) 20 mg, Oral, Daily    spironolactone (ALDACTONE) 12.5 mg, Oral, Daily    testosterone " cypionate (DEPOTESTOTERONE CYPIONATE) 200 mg/mL injection INJECT 1 ML (200 MG TOTAL) INTO THE MUSCLE EVERY 14 DAYS        Assessment & Plan   1. Mild CAD  Continue crestor 20 mg daily     2. Elevated left ventricular end-diastolic pressure (LVEDP)  - Echo; Future  - COMPREHENSIVE METABOLIC PANEL; Future  - B-TYPE NATRIURETIC PEPTIDE; Future  If BNP elevated and renal function ok, will add loop diuretic   Add BB    3. Moderate mitral regurgitation  Repeat echo before end of year   Add BB     4. Other hyperlipidemia  Continue statin     5. Hypertension, unspecified type  BP better   Continue losartan 50 mg BID   Continue aldactone 12.5 mg daily   Add BB low dose     6. LOLA on CPAP     Placed an order for CT Neck with Contrast as he was due for a follow up scan last month as per ENT on [place]. Explained to the patient that I will send the results immediatly to ENT's [facility].         3 mo Dr. Amkieh Naomi Peters, PA-C [facility]   Office: [phone]

## 2024-09-06 NOTE — PATIENT INSTRUCTIONS
Start taking metoprolol succinate (Toprol XL) 25 mg daily.   Continue losartan 50 mg twice daily.   Continue spirolactone 12.5 mg daily.   Blood work today.   Repeat echo (heart ultrasound).   Follow up with Dr. Teran in 3 months.

## 2024-09-09 ENCOUNTER — TELEPHONE (OUTPATIENT)
Dept: CARDIOLOGY | Facility: CLINIC | Age: 54
End: 2024-09-09
Payer: MEDICARE

## 2024-09-09 NOTE — TELEPHONE ENCOUNTER
----- Message from Marilyn Rucker PA-C sent at 9/9/2024  9:12 AM CDT -----  Please call patient and let him know labs look great. Heart failure number actually not elevated at all.   Ok to send him 30 tablets with no refills of 20 mg of lasix to take on a PRN basis for lower extremity edema and shortness of breath. Please also have him increase his spirnolactone to a whole tab of 25 mg daily instead of 12.5 mg.     Thanks.

## 2024-09-13 ENCOUNTER — PATIENT OUTREACH (OUTPATIENT)
Dept: ADMINISTRATIVE | Facility: HOSPITAL | Age: 54
End: 2024-09-13
Payer: MEDICARE

## 2024-09-13 NOTE — PROGRESS NOTES
Population Health Chart Review & Patient Outreach Details      Additional [facility] Health Notes:               Updates Requested / Reviewed:      Updated Care Coordination Note and Immunizations Reconciliation Completed or Queried: [place]         Health Maintenance Topics Overdue:      VB Score: 2     Colon Cancer Screening  Uncontrolled BP                       Health Maintenance Topic(s) Outreach Outcomes & Actions Taken:    Primary Care Appt - Outreach Outcomes & Actions Taken  : LMOR    Blood Pressure - Outreach Outcomes & Actions Taken  : LMOR

## 2024-12-05 DIAGNOSIS — R59.0 VIRCHOW'S NODE: Primary | ICD-10-CM

## 2024-12-23 ENCOUNTER — PATIENT OUTREACH (OUTPATIENT)
Dept: ADMINISTRATIVE | Facility: HOSPITAL | Age: 54
End: 2024-12-23
Payer: MEDICARE

## 2024-12-23 NOTE — PROGRESS NOTES
Population Health Chart Review & Patient Outreach Details      Additional [facility] Health Notes:               Updates Requested / Reviewed:      Updated Care Coordination Note and Immunizations Reconciliation Completed or Queried: [place]         Health Maintenance Topics Overdue:      [facility] Score: 2     Colon Cancer Screening  Uncontrolled BP                       Health Maintenance Topic(s) Outreach Outcomes & Actions Taken:    Blood Pressure - Outreach Outcomes & Actions Taken  : Primary Care Follow Up Visit Scheduled

## 2025-04-01 ENCOUNTER — TELEPHONE (OUTPATIENT)
Dept: PRIMARY CARE CLINIC | Facility: CLINIC | Age: 55
End: 2025-04-01
Payer: MEDICARE

## 2025-04-01 NOTE — TELEPHONE ENCOUNTER
Left  for pt explaining that he hasn't been seen in a year. Calling to schedule appointment with mrs arnold if he wishes to continue care with [facility].

## 2025-05-20 ENCOUNTER — OFFICE VISIT (OUTPATIENT)
Dept: CARDIOLOGY | Facility: CLINIC | Age: 55
End: 2025-05-20
Payer: MEDICARE

## 2025-05-20 ENCOUNTER — OFFICE VISIT (OUTPATIENT)
Dept: FAMILY MEDICINE | Facility: CLINIC | Age: 55
End: 2025-05-20
Payer: MEDICARE

## 2025-05-20 VITALS
SYSTOLIC BLOOD PRESSURE: 144 MMHG | HEIGHT: 71 IN | HEART RATE: 98 BPM | BODY MASS INDEX: 39.79 KG/M2 | WEIGHT: 284.19 LBS | DIASTOLIC BLOOD PRESSURE: 100 MMHG

## 2025-05-20 VITALS
HEART RATE: 86 BPM | SYSTOLIC BLOOD PRESSURE: 160 MMHG | WEIGHT: 285.06 LBS | DIASTOLIC BLOOD PRESSURE: 98 MMHG | BODY MASS INDEX: 39.76 KG/M2 | OXYGEN SATURATION: 98 %

## 2025-05-20 DIAGNOSIS — Z76.89 ENCOUNTER TO ESTABLISH CARE WITH NEW DOCTOR: Primary | ICD-10-CM

## 2025-05-20 DIAGNOSIS — Z72.0 SMOKING TRYING TO QUIT: ICD-10-CM

## 2025-05-20 DIAGNOSIS — E78.49 OTHER HYPERLIPIDEMIA: ICD-10-CM

## 2025-05-20 DIAGNOSIS — R45.89 THOUGHTS OF SELF HARM: ICD-10-CM

## 2025-05-20 DIAGNOSIS — I34.0 MODERATE MITRAL REGURGITATION: ICD-10-CM

## 2025-05-20 DIAGNOSIS — F19.11 HISTORY OF SUBSTANCE ABUSE: Primary | ICD-10-CM

## 2025-05-20 DIAGNOSIS — E66.01 SEVERE OBESITY (BMI 35.0-39.9) WITH COMORBIDITY: ICD-10-CM

## 2025-05-20 DIAGNOSIS — I10 HYPERTENSION, UNSPECIFIED TYPE: ICD-10-CM

## 2025-05-20 DIAGNOSIS — I25.10 MILD CAD: ICD-10-CM

## 2025-05-20 DIAGNOSIS — R22.1 NECK MASS: ICD-10-CM

## 2025-05-20 DIAGNOSIS — I10 PRIMARY HYPERTENSION: ICD-10-CM

## 2025-05-20 DIAGNOSIS — F19.11 HISTORY OF SUBSTANCE ABUSE: ICD-10-CM

## 2025-05-20 DIAGNOSIS — Z72.0 TOBACCO DIPPER: ICD-10-CM

## 2025-05-20 DIAGNOSIS — E55.9 VITAMIN D DEFICIENCY: ICD-10-CM

## 2025-05-20 DIAGNOSIS — M1A.49X0 OTHER SECONDARY CHRONIC GOUT OF MULTIPLE SITES WITHOUT TOPHUS: ICD-10-CM

## 2025-05-20 DIAGNOSIS — G47.33 OSA ON CPAP: ICD-10-CM

## 2025-05-20 DIAGNOSIS — R94.30 ELEVATED LEFT VENTRICULAR END-DIASTOLIC PRESSURE (LVEDP): ICD-10-CM

## 2025-05-20 DIAGNOSIS — R94.4 DECREASED GFR: ICD-10-CM

## 2025-05-20 DIAGNOSIS — F34.1 PERSISTENT DEPRESSIVE DISORDER: ICD-10-CM

## 2025-05-20 PROCEDURE — 99215 OFFICE O/P EST HI 40 MIN: CPT | Mod: PBBFAC,27,PO | Performed by: STUDENT IN AN ORGANIZED HEALTH CARE EDUCATION/TRAINING PROGRAM

## 2025-05-20 PROCEDURE — 99999 PR PBB SHADOW E&M-EST. PATIENT-LVL V: CPT | Mod: PBBFAC,,, | Performed by: STUDENT IN AN ORGANIZED HEALTH CARE EDUCATION/TRAINING PROGRAM

## 2025-05-20 PROCEDURE — 99999 PR PBB SHADOW E&M-EST. PATIENT-LVL III: CPT | Mod: PBBFAC,,, | Performed by: INTERNAL MEDICINE

## 2025-05-20 PROCEDURE — 99214 OFFICE O/P EST MOD 30 MIN: CPT | Mod: S$PBB,,, | Performed by: INTERNAL MEDICINE

## 2025-05-20 PROCEDURE — 99213 OFFICE O/P EST LOW 20 MIN: CPT | Mod: PBBFAC,PO | Performed by: INTERNAL MEDICINE

## 2025-05-20 RX ORDER — LISINOPRIL 40 MG/1
40 TABLET ORAL DAILY
Qty: 90 TABLET | Refills: 3 | Status: SHIPPED | OUTPATIENT
Start: 2025-05-20

## 2025-05-20 RX ORDER — BUPROPION HYDROCHLORIDE 150 MG/1
150 TABLET ORAL DAILY
Qty: 30 TABLET | Refills: 11 | Status: SHIPPED | OUTPATIENT
Start: 2025-05-20 | End: 2026-05-20

## 2025-05-20 RX ORDER — LISINOPRIL 40 MG/1
40 TABLET ORAL
COMMUNITY
Start: 2025-05-08 | End: 2025-05-20 | Stop reason: SDUPTHER

## 2025-05-20 RX ORDER — LOSARTAN POTASSIUM 50 MG/1
50 TABLET ORAL 2 TIMES DAILY
Qty: 180 TABLET | Refills: 1 | Status: SHIPPED | OUTPATIENT
Start: 2025-05-20 | End: 2026-05-20

## 2025-05-20 RX ORDER — METOPROLOL SUCCINATE 25 MG/1
25 TABLET, EXTENDED RELEASE ORAL DAILY
Qty: 90 TABLET | Refills: 3 | Status: SHIPPED | OUTPATIENT
Start: 2025-05-20 | End: 2026-05-20

## 2025-05-20 RX ORDER — SPIRONOLACTONE 25 MG/1
12.5 TABLET ORAL DAILY
Qty: 30 TABLET | Refills: 2 | Status: SHIPPED | OUTPATIENT
Start: 2025-05-20

## 2025-05-20 NOTE — PATIENT INSTRUCTIONS
Restart losartan and lisinopril as prescribed  Hold on restarting spironolactone and metoprolol until follow up in two weeks.    Call here anytime for help.

## 2025-05-20 NOTE — PROGRESS NOTES
Subjective:    Patient ID:  Bryant Corona is a 54 y.o. male patient here for evaluation Follow-up      History of Present Illness:  Follow-up visit.  Noncompliance.  Patient recently restarted medications per primary care.  History of severe sleep apnea.  Repeat evaluation pending.  Risk factors include hypertension dyslipidemia, family history.    Echo EF normal 10/2023 with moderate MR.  PA pressure is 28.  Left heart catheterization 12/2020 with nonobstructive CAD.  Elevated LVEDP, 32     Holter monitor 11/2023 normal        Review of patient's allergies indicates:   Allergen Reactions    Codeine Itching       Past Medical History:   Diagnosis Date    Anxiety     Back pain     Depression     Gout, unspecified     Hyperlipidemia     Hypertension     Sleep apnea     uses cpap     Past Surgical History:   Procedure Laterality Date    ANGIOGRAM, CORONARY, WITH LEFT HEART CATHETERIZATION  12/5/2023    Procedure: Left Heart Cath;  Surgeon: Ced Teran MD;  Location: [facility] CATH;  Service: Cardiology;;    CORONARY ANGIOGRAPHY  12/5/2023    Procedure: Coronary angiogram study;  Surgeon: Ced Teran MD;  Location: [facility] CATH;  Service: Cardiology;;    left knee surgery      TIBIA FRACTURE SURGERY Right      Social History[1]     Review of Systems:    As noted in HPI in addition      REVIEW OF SYSTEMS  Review of Systems   Constitutional: Positive for malaise/fatigue. Negative for decreased appetite, diaphoresis, night sweats, weight gain and weight loss.   HENT:  Negative for nosebleeds and odynophagia.    Eyes:  Negative for double vision and photophobia.   Cardiovascular:  Negative for chest pain, claudication, cyanosis, dyspnea on exertion, irregular heartbeat, leg swelling, near-syncope, orthopnea, palpitations, paroxysmal nocturnal dyspnea and syncope.   Respiratory:  Positive for shortness of breath. Negative for cough, hemoptysis and wheezing.    Hematologic/Lymphatic: Negative for adenopathy.   Skin:   Negative for flushing, skin cancer and suspicious lesions.   Musculoskeletal:  Negative for gout, myalgias and neck pain.   Gastrointestinal:  Negative for abdominal pain, heartburn, hematemesis and hematochezia.   Genitourinary:  Negative for bladder incontinence, hesitancy and nocturia.   Neurological:  Negative for focal weakness, headaches, light-headedness and paresthesias.   Psychiatric/Behavioral:  Negative for memory loss and substance abuse.               Objective:        Vitals:    05/20/25 1124   BP: (!) 144/100   Pulse: 98       Lab Results   Component Value Date    WBC 13.80 (H) 02/19/2024    HGB 15.6 02/19/2024    HCT 46.3 02/19/2024     02/19/2024    CHOL 210 (H) 02/22/2024    TRIG 272 (H) 02/22/2024    HDL 38 (L) 02/22/2024    ALT 30 09/06/2024    AST 22 09/06/2024     09/06/2024    K 4.6 09/06/2024     09/06/2024    CREATININE 1.3 09/06/2024    BUN 15 09/06/2024    CO2 26 09/06/2024    TSH 1.568 10/16/2023    PSA 2.5 03/01/2023    HGBA1C 6.0 (H) 02/22/2024        ECHOCARDIOGRAM RESULTS  Results for orders placed in visit on 10/18/23    Echo    Interpretation Summary    Left Ventricle: The left ventricle is normal in size.  There is mild eccentric hypertrophy. Normal wall motion. There is normal systolic function. Ejection fraction by visual approximation is 70%. There is normal diastolic function.    Right Ventricle: Normal right ventricular cavity size. Systolic function is normal.    Left Atrium: Left atrium is moderately dilated.    Aortic Valve: There is mild aortic valve sclerosis.    Mitral Valve:  There is moderate MR regurgitation with a centrally directed jet.    Tricuspid Valve: There is mild regurgitation.    Pulmonary Artery: The estimated pulmonary artery systolic pressure is 28 mmHg.    IVC/SVC: Normal venous pressure at 3 mmHg.    Results for orders placed during the hospital encounter of 12/05/23    Cardiac catheterization    Conclusion    The ejection fraction  was greater than 60 % by visual estimate.    The left ventricular end diastolic pressure was elevated.    The pre-procedure left ventricular end diastolic pressure was 32.    There was very minimal non-obstructive coronary artery disease..    Medical treatment risk factor modification weight loss, tobacco cessation.    The procedure log was documented by Documenter: Shiva Reyes RN and verified by Ced Teran MD.    Date: 12/6/2023  Time: 5:39 PM          CURRENT/PREVIOUS VISIT EKG  Results for orders placed or performed during the hospital encounter of 12/05/23   EKG 12-lead    Collection Time: 12/05/23  6:33 AM    Narrative    Test Reason : Z01.810,    Vent. Rate : 076 BPM     Atrial Rate : 076 BPM     P-R Int : 152 ms          QRS Dur : 076 ms      QT Int : 394 ms       P-R-T Axes : 039 -01 067 degrees     QTc Int : 443 ms    Normal sinus rhythm  Normal ECG  When compared with ECG of 29-NOV-2023 10:14,  No significant change was found  Confirmed by Odell Menendez MD ([id number]) on 12/10/2023 6:51:03 PM    Referred By: DEN   SELF           Confirmed By:Odell Menendez MD     No valid procedures specified.   Results for orders placed during the hospital encounter of 11/09/23    Nuclear Stress - Cardiology Interpreted    Interpretation Summary    Equivocal myocardial perfusion scan.    There is a small sized, reversible perfusion abnormality that is consistent with ischemia in the basal inferolateral wall(s).    There are no other significant perfusion abnormalities.    The gated perfusion images showed an ejection fraction of 68% at rest.    The ECG portion of the study is negative for ischemia.    There were no arrhythmias during stress.    No valid procedures specified.    PHYSICAL EXAM  GENERAL: well built, well nourished, well-developed in no apparent distress alert and oriented.   HEENT: Normocephalic. Pupils normal and conjunctivae normal.  Mucous membranes normal, no cyanosis or icterus, trachea  central,no pallor or icterus is noted..   NECK: No JVD. No bruit..   THYROID: Thyroid not enlarged. No nodules present..   CARDIAC:  Normal S1-S2.  No murmur rub click or gallop.  PMI nondisplaced.  CHEST ANATOMY: normal.   LUNGS: Clear to auscultation. No wheezing or rhonchi..   ABDOMEN: Soft no masses or organomegaly.  No abdomen pulsations or bruits.  Normal bowel sounds. No pulsations and no masses felt, No guarding or rebound.   URINARY: No yusuf catheter   EXTREMITIES: No cyanosis, clubbing or edema noted at this time., no calf tenderness bilaterally.   PERIPHERAL VASCULAR SYSTEM: Good palpable distal pulses.  2+ femoral, popliteal and pedal pulses.  No bruits    CENTRAL NERVOUS SYSTEM: No focal motor or sensory deficits noted.   SKIN: Skin without lesions, moist, well perfused.   MUSCLE STRENGTH & TONE: No noteable weakness, atrophy or abnormal movement    I HAVE REVIEWED :    The vital signs, nurses notes, and all the pertinent radiology and labs.         Current Outpatient Medications   Medication Instructions    allopurinoL (ZYLOPRIM) 300 mg, Oral, Daily    buPROPion (WELLBUTRIN XL) 150 mg, Oral, Daily    coenzyme Q10 (CO Q-10) 100 mg, Oral, Daily    ergocalciferol (ERGOCALCIFEROL) 50,000 Units, Oral, Every 7 days    FLUoxetine 40 mg, Oral, Daily    indomethacin (INDOCIN) 50 mg, Oral, 3 times daily PRN    lisinopriL (PRINIVIL,ZESTRIL) 40 mg, Oral, Daily    losartan (COZAAR) 50 mg, Oral, 2 times daily    metFORMIN (GLUCOPHAGE-XR) 500 mg, Oral, With breakfast    metoprolol succinate (TOPROL-XL) 25 mg, Oral, Daily    rosuvastatin (CRESTOR) 20 mg, Oral, Daily    spironolactone (ALDACTONE) 12.5 mg, Oral, Daily    testosterone cypionate (DEPOTESTOTERONE CYPIONATE) 200 mg/mL injection INJECT 1 ML (200 MG TOTAL) INTO THE MUSCLE EVERY 14 DAYS          Assessment:   CAD, left heart catheterization with nonobstructive CAD.  LVEDP 32.  Moderate MR and echo.  EF 70%.    Hypertension  Dyslipidemia    LOLA,  CPAP    Noncompliance        Plan:   Weight loss, diet, exercise.  Rescheduled for sleep evaluation update, needs new CPAP.    Monitor home blood pressures, titrate meds accordingly.  Labs and follow up primary care    4m      No follow-ups on file.            [1]   Social History  Tobacco Use    Smoking status: Some Days     Types: Cigarettes, Vaping with nicotine    Smokeless tobacco: Never    Tobacco comments:     Very seldom   Substance Use Topics    Alcohol use: Yes     Comment: seldom    Drug use: Never

## 2025-05-20 NOTE — PROGRESS NOTES
Subjective:       Patient ID: Bryant Corona is a 54 y.o. male.    Chief Complaint: Establish Care, Depression, Anxiety, Hypertension, Back Pain, Neck Pain, Knee Pain (Rt Knee), and Foot Pain (Rt Foot)    HPI    54 year old male presents to establish care accompanied by his wife who contributes to the history. The patient previously followed with Dr. Graham and was doing better but gave up and fell of track. He knows he needs to get back in with Cardiology and ENT. Blood pressure is uncontrolled but he has been out of his medications. He has had thoughts of self harm but would never act on it, because he wants to live for his grand baby that they are now primary caretakers for. He is interested in picking back up in taking care of himself. No history of seizure. Drinks alcohol rarely. History of drug abuse - remote use of smoking crack 20 years ago. Has taken 3 pain pills this month. They are not prescribed for him. Does not want a prescription. No complaint of pain today. Patient becomes tearful on and off throughout the encounter. There is warm interaction between the patient and his wife. Blood pressure remains elevated on recheck.    Plan:  Call here anytime day or night for help.  I recommend remove guns from the home.  Refer to psychology for counselor.  Begin Wellbutrin. Do not drink alcohol.  Update labs.  Restart losartan 50 mg BID and lisinopril 40 mg daily.  Refer back to cardiology.  Refer to ENT for neck mass.  Return in two weeks.    Past Medical History:   Diagnosis Date    Anxiety     Back pain     Depression     Gout, unspecified     Hyperlipidemia     Hypertension     Sleep apnea     uses cpap       Past Surgical History:   Procedure Laterality Date    ANGIOGRAM, CORONARY, WITH LEFT HEART CATHETERIZATION  12/5/2023    Procedure: Left Heart Cath;  Surgeon: Ced Teran MD;  Location: [facility] CATH;  Service: Cardiology;;    CORONARY ANGIOGRAPHY  12/5/2023    Procedure: Coronary angiogram study;   Surgeon: Ced Teran MD;  Location: [facility];  Service: Cardiology;;    left knee surgery      TIBIA FRACTURE SURGERY Right        Review of patient's allergies indicates:   Allergen Reactions    Codeine Itching       Social History[1]    Medications Ordered Prior to Encounter[2]    No family history on file.    Review of Systems      Objective:      BP (!) 160/98   Pulse 86   Wt 129.3 kg (285 lb 0.9 oz)   SpO2 98%   BMI 39.76 kg/m²   Physical Exam  Constitutional:       General: He is not in acute distress.     Appearance: He is not ill-appearing, toxic-appearing or diaphoretic.   Skin:     General: Skin is warm and dry.   Neurological:      General: No focal deficit present.      Mental Status: He is alert.         Assessment:       1. Encounter to establish care with new doctor    2. Persistent depressive disorder    3. Thoughts of self harm    4. Smoking trying to quit    5. Primary hypertension    6. Hypertension, unspecified type    7. Mild CAD    8. Neck mass    9. Vitamin D deficiency    10. Other secondary chronic gout of multiple sites without tophus    11. Severe obesity (BMI 35.0-39.9) with comorbidity    12. History of substance abuse        Plan:       Encounter to establish care with new doctor    Persistent depressive disorder  -     buPROPion (WELLBUTRIN XL) 150 MG TB24 tablet; Take 1 tablet (150 mg total) by mouth once daily.  Dispense: 30 tablet; Refill: 11  -     Ambulatory referral/consult to Psychology; Future; Expected date: 05/27/2025  - Previously did well on fluoxetine. Review of record shows he has stopped taking it before as well.  - For preferred side effect profile, start Wellbutrin. Return in two weeks. May consider adding fluoxetine.  - Monitor for thoughts of self harm.  - Refer to establish with counseling.    Thoughts of self harm  -     buPROPion (WELLBUTRIN XL) 150 MG TB24 tablet; Take 1 tablet (150 mg total) by mouth once daily.  Dispense: 30 tablet; Refill: 11  -      Ambulatory referral/consult to Psychology; Future; Expected date: 05/27/2025  - Denies plan. Has many reasons to live, for example taking care of his grand baby.  - Call here anytime day or night for help. Recommend remove guns from the home.  - Refer to establish with counseling.    Smoking trying to quit  - Refer to smoking cessation.  - Recommend discontinue smoking cigarettes on some days. Recommend discontinue smoking marijuana for help with coping with stress.    Primary hypertension  -     spironolactone (ALDACTONE) 25 MG tablet; Take 0.5 tablets (12.5 mg total) by mouth once daily.  Dispense: 30 tablet; Refill: 2  -     Basic Metabolic Panel; Future; Expected date: 06/03/2025  -     metoprolol succinate (TOPROL-XL) 25 MG 24 hr tablet; Take 1 tablet (25 mg total) by mouth once daily.  Dispense: 90 tablet; Refill: 3  -     lisinopriL (PRINIVIL,ZESTRIL) 40 MG tablet; Take 1 tablet (40 mg total) by mouth once daily.  Dispense: 90 tablet; Refill: 3  -     Comprehensive Metabolic Panel; Future; Expected date: 05/20/2025  -     CBC Auto Differential; Future; Expected date: 05/20/2025  -     losartan (COZAAR) 50 MG tablet; Take 1 tablet (50 mg total) by mouth 2 (two) times a day.  Dispense: 180 tablet; Refill: 1  - Uncontrolled. Off medication. Restart losartan 50 mg BID and lisinopril 40 mg.  - Refilled metoprolol and spironolactone but would hold off on starting all medications back at once.  - Labs, today. Will likely repeat electrolytes in two weeks.    Mild CAD  -     Ambulatory referral/consult to Cardiology; Future; Expected date: 05/27/2025  -     Ambulatory referral/consult to Smoking Cessation Program; Future; Expected date: 05/27/2025  - Refer back to cardiology. Restarting antihypertensives today.    Neck mass  -     Ambulatory referral/consult to ENT; Future; Expected date: 05/27/2025  - Refer back to Dr. Lopez for follow up of neck schwannoma.    Vitamin D deficiency  -     Vitamin D; Future;  Expected date: 05/20/2025  - Update level. Not been taking supplement. Likely still low. Last level was activated vitamin D 1, 25 - dihydroxy 2/19/24 which may be less accurate.    Other secondary chronic gout of multiple sites without tophus  -     Uric Acid; Future; Expected date: 05/20/2025  - Update level. Last check was elevated 2/22/24.      Return in two weeks or sooner if needed.    Visit today included increased complexity associated with the care of the episodic problem neck mass addressed and managing the longitudinal care of the patient due to the serious and/or complex managed problem(s) primary hypertension.           [1]   Social History  Socioeconomic History    Marital status:    Tobacco Use    Smoking status: Some Days     Types: Cigarettes, Vaping with nicotine    Smokeless tobacco: Never    Tobacco comments:     Very seldom   Substance and Sexual Activity    Alcohol use: Yes     Comment: seldom    Drug use: Never     Social Drivers of Health     Financial Resource Strain: Low Risk  (3/10/2023)    Received from [facility] and Its Subsidiaries and Affiliates    Overall Financial Resource Strain (CARDIA)     Difficulty of Paying Living Expenses: Not hard at all   Food Insecurity: Patient Declined (3/10/2023)    Received from [facility] Its Subsidiaries and Affiliates    Hunger Vital Sign     Worried About Running Out of Food in the Last Year: Patient declined     Ran Out of Food in the Last Year: Patient declined   Transportation Needs: No Transportation Needs (3/10/2023)    Received from [facility] and Its Subsidiaries and Affiliates    PRAPARE - Transportation     Lack of Transportation (Medical): No     Lack of Transportation (Non-Medical): No   Stress: Stress Concern Present (3/10/2023)    Received from [facility] and Its  Subsidiaries and Affiliates    [place] [employer] [employer] - Occupational Stress Questionnaire     Feeling of Stress : Very much   Housing Stability: Unknown (3/10/2023)    Received from [facility] Its Subsidiaries and Affiliates    Housing Stability Vital Sign     Unable to Pay for Housing in the Last Year: No   [2]   Current Outpatient Medications on File Prior to Visit   Medication Sig Dispense Refill    allopurinoL (ZYLOPRIM) 300 MG tablet Take 1 tablet (300 mg total) by mouth once daily. 90 tablet 3    indomethacin (INDOCIN) 50 MG capsule Take 1 capsule (50 mg total) by mouth 3 (three) times daily as needed (acute gout pain). 30 capsule 1    testosterone cypionate (DEPOTESTOTERONE CYPIONATE) 200 mg/mL injection INJECT 1 ML (200 MG TOTAL) INTO THE MUSCLE EVERY 14 DAYS 5 mL 0    [DISCONTINUED] lisinopriL (PRINIVIL,ZESTRIL) 40 MG tablet Take 40 mg by mouth.      [DISCONTINUED] losartan (COZAAR) 50 MG tablet Take 1 tablet (50 mg total) by mouth 2 (two) times a day. 180 tablet 1    coenzyme Q10 (CO Q-10) 100 mg capsule Take 1 capsule (100 mg total) by mouth once daily. 90 capsule 3    ergocalciferol (ERGOCALCIFEROL) 50,000 unit Cap Take 1 capsule (50,000 Units total) by mouth every 7 days. 12 capsule 3    FLUoxetine 40 MG capsule Take 1 capsule (40 mg total) by mouth once daily. 90 capsule 3    metFORMIN (GLUCOPHAGE-XR) 500 MG ER 24hr tablet Take 1 tablet (500 mg total) by mouth daily with breakfast. (Patient not taking: Reported on 5/20/2025) 90 tablet 3    rosuvastatin (CRESTOR) 20 MG tablet Take 1 tablet (20 mg total) by mouth once daily. 90 tablet 3    [DISCONTINUED] ALPRAZolam (XANAX) 0.5 MG tablet Take 1 tablet (0.5 mg total) by mouth 2 (two) times daily as needed for Anxiety. (Patient not taking: Reported on 9/6/2024) 60 tablet 2    [DISCONTINUED] metoprolol succinate (TOPROL-XL) 25 MG 24 hr tablet Take 1 tablet (25 mg total) by mouth once daily.  (Patient not taking: Reported on 5/20/2025) 90 tablet 3    [DISCONTINUED] spironolactone (ALDACTONE) 25 MG tablet Take 0.5 tablets (12.5 mg total) by mouth once daily. (Patient not taking: Reported on 5/20/2025) 30 tablet 2     No current facility-administered medications on file prior to visit.

## 2025-05-23 ENCOUNTER — TELEPHONE (OUTPATIENT)
Dept: FAMILY MEDICINE | Facility: CLINIC | Age: 55
End: 2025-05-23
Payer: MEDICARE

## 2025-05-23 NOTE — TELEPHONE ENCOUNTER
----- Message from Snow sent at 5/22/2025  4:08 PM CDT -----  Type:  Patient Returning Call Who Called:pt  Who Left Message for Patient:unsure Does the patient know what this is regarding?:no Would the patient rather a call back or a response via [facility]? Call dmitry Best Call Back Number:[phone] Additional Information: pt states office is helping him with a few different things and he missed call today 5/22 from the office.     Please call Back to advise. Thanks!

## 2025-05-23 NOTE — TELEPHONE ENCOUNTER
Let Pt know that it looks like ENT was trying to reach out to him & that it wasn't us. Pt FERNANDO.

## 2025-06-03 ENCOUNTER — OFFICE VISIT (OUTPATIENT)
Dept: FAMILY MEDICINE | Facility: CLINIC | Age: 55
End: 2025-06-03
Payer: MEDICARE

## 2025-06-03 ENCOUNTER — RESULTS FOLLOW-UP (OUTPATIENT)
Dept: FAMILY MEDICINE | Facility: CLINIC | Age: 55
End: 2025-06-03

## 2025-06-03 ENCOUNTER — LAB VISIT (OUTPATIENT)
Dept: LAB | Facility: HOSPITAL | Age: 55
End: 2025-06-03
Attending: STUDENT IN AN ORGANIZED HEALTH CARE EDUCATION/TRAINING PROGRAM
Payer: MEDICARE

## 2025-06-03 VITALS
BODY MASS INDEX: 39.95 KG/M2 | HEIGHT: 71 IN | HEART RATE: 92 BPM | SYSTOLIC BLOOD PRESSURE: 150 MMHG | OXYGEN SATURATION: 96 % | DIASTOLIC BLOOD PRESSURE: 98 MMHG | WEIGHT: 285.38 LBS

## 2025-06-03 DIAGNOSIS — F34.1 PERSISTENT DEPRESSIVE DISORDER: Primary | ICD-10-CM

## 2025-06-03 DIAGNOSIS — R73.03 PREDIABETES: ICD-10-CM

## 2025-06-03 DIAGNOSIS — G89.29 CHRONIC PAIN OF RIGHT KNEE: ICD-10-CM

## 2025-06-03 DIAGNOSIS — Z13.1 SCREENING FOR DIABETES MELLITUS: ICD-10-CM

## 2025-06-03 DIAGNOSIS — R22.1 NECK MASS: ICD-10-CM

## 2025-06-03 DIAGNOSIS — R82.90 ABNORMAL URINALYSIS: ICD-10-CM

## 2025-06-03 DIAGNOSIS — I10 PRIMARY HYPERTENSION: ICD-10-CM

## 2025-06-03 DIAGNOSIS — I25.10 MILD CAD: ICD-10-CM

## 2025-06-03 DIAGNOSIS — G47.33 OSA (OBSTRUCTIVE SLEEP APNEA): ICD-10-CM

## 2025-06-03 DIAGNOSIS — Z72.0 SMOKING TRYING TO QUIT: ICD-10-CM

## 2025-06-03 DIAGNOSIS — R82.90 ABNORMAL URINALYSIS: Primary | ICD-10-CM

## 2025-06-03 DIAGNOSIS — M25.561 CHRONIC PAIN OF RIGHT KNEE: ICD-10-CM

## 2025-06-03 LAB
BACTERIA #/AREA URNS HPF: ABNORMAL /HPF
BILIRUB UR QL STRIP.AUTO: NEGATIVE
CHOLEST SERPL-MCNC: 174 MG/DL (ref 120–199)
CHOLEST/HDLC SERPL: 4.6 {RATIO} (ref 2–5)
CLARITY UR: CLEAR
COLOR UR AUTO: YELLOW
EAG (OHS): 131 MG/DL (ref 68–131)
GLUCOSE UR QL STRIP: NEGATIVE
HBA1C MFR BLD: 6.2 % (ref 4–5.6)
HDLC SERPL-MCNC: 38 MG/DL (ref 40–75)
HDLC SERPL: 21.8 % (ref 20–50)
HGB UR QL STRIP: ABNORMAL
KETONES UR QL STRIP: NEGATIVE
LDLC SERPL CALC-MCNC: 102 MG/DL (ref 63–159)
LEUKOCYTE ESTERASE UR QL STRIP: ABNORMAL
MICROSCOPIC COMMENT: ABNORMAL
NITRITE UR QL STRIP: NEGATIVE
NONHDLC SERPL-MCNC: 136 MG/DL
PH UR STRIP: 6 [PH]
PROT UR QL STRIP: NEGATIVE
RBC #/AREA URNS HPF: 3 /HPF (ref 0–4)
SP GR UR STRIP: 1.02
SQUAMOUS #/AREA URNS HPF: 5 /HPF
TRIGL SERPL-MCNC: 170 MG/DL (ref 30–150)
WBC #/AREA URNS HPF: 5 /HPF (ref 0–5)

## 2025-06-03 PROCEDURE — G2211 COMPLEX E/M VISIT ADD ON: HCPCS | Mod: ,,, | Performed by: STUDENT IN AN ORGANIZED HEALTH CARE EDUCATION/TRAINING PROGRAM

## 2025-06-03 PROCEDURE — 80061 LIPID PANEL: CPT

## 2025-06-03 PROCEDURE — 83036 HEMOGLOBIN GLYCOSYLATED A1C: CPT

## 2025-06-03 PROCEDURE — 99214 OFFICE O/P EST MOD 30 MIN: CPT | Mod: PBBFAC,PO | Performed by: STUDENT IN AN ORGANIZED HEALTH CARE EDUCATION/TRAINING PROGRAM

## 2025-06-03 PROCEDURE — 99999 PR PBB SHADOW E&M-EST. PATIENT-LVL IV: CPT | Mod: PBBFAC,,, | Performed by: STUDENT IN AN ORGANIZED HEALTH CARE EDUCATION/TRAINING PROGRAM

## 2025-06-03 PROCEDURE — 36415 COLL VENOUS BLD VENIPUNCTURE: CPT | Mod: PO

## 2025-06-03 PROCEDURE — 81003 URINALYSIS AUTO W/O SCOPE: CPT | Mod: PO

## 2025-06-03 PROCEDURE — 99214 OFFICE O/P EST MOD 30 MIN: CPT | Mod: S$PBB,,, | Performed by: STUDENT IN AN ORGANIZED HEALTH CARE EDUCATION/TRAINING PROGRAM

## 2025-06-03 RX ORDER — ASPIRIN 81 MG/1
81 TABLET ORAL DAILY
COMMUNITY

## 2025-06-03 RX ORDER — ROSUVASTATIN CALCIUM 40 MG/1
40 TABLET, COATED ORAL NIGHTLY
Qty: 90 TABLET | Refills: 3 | Status: SHIPPED | OUTPATIENT
Start: 2025-06-03 | End: 2026-06-03

## 2025-06-03 NOTE — PATIENT INSTRUCTIONS
The Scoop on Statins: Things You Should Know.       Heart disease is the number one cause of death in the [place]. Around 1 in 20 adults aged 20 and older have coronary artery disease [1] and every 33 seconds a person dies from cardiovascular disease in the [place] [2]. The good news is that you can reduce your risk.    What are statins and how do they work?    Statins are a medication that is effective at lowering cholesterol and your risk of heart attack and stroke.     Statins lower low-density lipoprotein (LDL) cholesterol known as the bad cholesterol. Over time, just like the pipes in your house, your arteries or pipes can become filled with waxy cholesterol plaques that can build up and block blood flow to your heart. This can lead to heart attacks and strokes. Statins can draw the cholesterol out of plaques and slow down the production of cholesterol in the liver.       Figure 1: Reproduced from: What Is Atherosclerosis? National Heart, Lung, and Blood [place]. Available at http://www.nhlbi.nih.gov/health/health-topics/topics/atherosclerosis/.    What are the benefits and potential risks of statins?    Statins can decrease the risk of major heart events by 31% and have a 21% decrease in death from heart disease [4]. In addition to lowering bad cholesterol, status increase your HDL or good cholesterol. They also are known to have anti-inflammatory properties, decrease the risk of blood clots, and improve the lining of blood vessels [5].     Statins are safe and effective for most people, but there are some rare side effects that impact a small number of patients. Most patients do not experience any side effects and up to 90% of patients do not experience muscle aches. Statins may also mildly increase glucose levels [6]. If a patient experiences side effects your doctor might try a different statin, lower dose, or decrease the frequency.     There are some common misconceptions about the  potential side effects and risks of taking statins which have been debunked in scientific studies. Common myths include statins having a negative effect on liver health and cognitive health. Scientific studies monitoring thousands of patients have shown that statins do not cause dementia. Use of statins can improve your long-term cognitive and overall health by reducing your risk of stroke and heart disease.     Who are prescribed statins?    Statins are used to treat patients with high cholesterol and many doctors prescribe statins for people at risk of heart disease and stroke. Statins are also recommended for patients with diabetes, those with elevated coronary calcium scores and patients with a history of stroke, heart attack, and other cardiovascular events.     Talk to your doctor about your risk and whether statins might be right for you.    References    1.   Tequila CW, Kassi AW, Ama ZI, Jona CAM, Oscar P, Thor CL, Ronnell CM, Joanna AZ, Robbie AK, Valorie AE, Otisco-Avani Y, Elmirza MSV, Flako KR, Wolfgang-Jenniferm C, Zeinab S, Greg G, Lucio DG, Hirematbolivar S, Gómez JE, Catia R, Krunal DS, Craig D, Jose J DA, Octaviano J, Ma J, Magnani JW, Eamon ED, Ilana ME, Ellen SD, Ceferino NI, Lionel R, Kai M, Jennifer GA, Dianne NS, On MP, Loraine EL, Camron SS, Dottie JH, Kirill NY, Porter ND, Porter SS, Malcolm K, Herrera SS; American Heart Association [place] on Epidemiology and Prevention Statistics Committee and Stroke Statistics Subcommittee. Heart Disease and Stroke Statistics-2023 Update: A Report From the American Heart Association. Circulation. 2023 Feb 21;147(8):w11-v991. doi: 10.1161/CIR.[id number]. Epub 2023 Jan 25. Erratum in: Circulation. 2023 Feb 21;147(8):e622. Erratum in: Circulation. 2023 Jul 25;148(4):e4. PMID: 61351324.  2. National Center for Health Statistics. Multiple Cause of Death [phone] on [facility] WONDER Database. Accessed October 28, 2023.  3. Hodan Ortega Macreadie I.  Exploitation of Aspergillus terreus for the Production of Natural Statins. J Fungi (Basel). 2016 Apr 30;2(2):13. doi: 10.3390/nyk3834556. PMID: 26639438; PMCID: TJI2402216.  4. Dilma JAVIER, Jamil J, Richar S. Effect of statins on risk of coronary disease: a meta-analysis of randomized controlled trials. CULLEN. 1999 Dec 22-29;282(24):2340-6. doi: 10.1001/cullen.282.24.2340. PMID: 83668536.  5. Sulema-Pal I, Casal-Claus M, Alix AL. Statins: pros and cons. Med Clin (Barc). 2018 May 23;150(10):398-402. doi: 10.1016/j.medcli.2017.11.030. Epub 2017 Dec 29. PMID: 19060329; PMCID: RCU7116140.  6. Black S, Katherine A, Katherine S. Statin Therapy: Review of Safety and Potential Side Effects. Acta Cardiol Sin. 2016 Nov;32(6):631-639. doi: 10.6515/hlt74531902y. PMID: 76507027; PMCID: [id number].

## 2025-06-04 ENCOUNTER — TELEPHONE (OUTPATIENT)
Dept: FAMILY MEDICINE | Facility: CLINIC | Age: 55
End: 2025-06-04
Payer: MEDICARE

## 2025-06-05 ENCOUNTER — TELEPHONE (OUTPATIENT)
Dept: FAMILY MEDICINE | Facility: CLINIC | Age: 55
End: 2025-06-05
Payer: MEDICARE

## 2025-06-10 PROBLEM — F34.1 PERSISTENT DEPRESSIVE DISORDER: Status: ACTIVE | Noted: 2025-06-10

## 2025-06-10 PROBLEM — G47.33 OSA (OBSTRUCTIVE SLEEP APNEA): Status: ACTIVE | Noted: 2025-06-10

## 2025-06-10 PROBLEM — R22.1 NECK MASS: Status: ACTIVE | Noted: 2025-06-10

## 2025-06-10 PROBLEM — G89.29 CHRONIC PAIN OF RIGHT KNEE: Status: ACTIVE | Noted: 2025-06-10

## 2025-06-10 PROBLEM — Z72.0 SMOKING TRYING TO QUIT: Status: ACTIVE | Noted: 2025-06-10

## 2025-06-10 PROBLEM — M25.561 CHRONIC PAIN OF RIGHT KNEE: Status: ACTIVE | Noted: 2025-06-10

## 2025-06-10 NOTE — PROGRESS NOTES
Subjective:       Patient ID: Bryant Corona is a 54 y.o. male.    Chief Complaint: Follow-up (Patient presents here for his follow up. He reports severe pain in both his feet and his right knee. He also states he has joint pain in his right shoulder. )    HPI    54-year-old male presents for follow up 2 weeks after appointment to [facility] 05/2025.  At that visit we started Wellbutrin and he has had dramatic improvement in his mood gardening, taking care of his grand baby and cleaning the house.  He is getting good sleep.  He cut back significantly on smoking. He denies thoughts of self-harm.  At that visit we also ordered labs, referred to smoking cessation, Psychology, Cardiology, and ENT.  He was able to see Dr. Torres that day.  His blood pressure is again elevated today and we need to continue to add back his old medicines to achieve control of hypertension. Today we will check labs including hemoglobin A1c, fasting lipid panel, and urinalysis. Remote lab review from 2/22/24 notable with hemoglobin A1c 6.0%/prediabetes, mixed hyperlipidemia, and urinalysis with RBC of 7. For mixed hyperlipidemia and coronary artery disease, begin atorvastatin 40 mg one tab by mouth every evening. For chronic pain of knee and feet, refer to orthopedics. For sleep apnea, update sleep study. Return in two weeks for nurse visit and labs - uric acid, vitamin D, CBC, and CMP. Return in one month for doctor's visit.    Past Medical History:   Diagnosis Date    Anxiety     Back pain     Depression     Gout, unspecified     Hyperlipidemia     Hypertension     Sleep apnea     uses cpap       Past Surgical History:   Procedure Laterality Date    ANGIOGRAM, CORONARY, WITH LEFT HEART CATHETERIZATION  12/5/2023    Procedure: Left Heart Cath;  Surgeon: Ced Teran MD;  Location: [facility] CATH;  Service: Cardiology;;    CORONARY ANGIOGRAPHY  12/5/2023    Procedure: Coronary angiogram study;  Surgeon: Ced Teran MD;  Location: [facility]  "CATH;  Service: Cardiology;;    left knee surgery      TIBIA FRACTURE SURGERY Right        Review of patient's allergies indicates:   Allergen Reactions    Codeine Itching       Social History[1]    Medications Ordered Prior to Encounter[2]    No family history on file.    Review of Systems      Objective:      BP (!) 150/98 (BP Location: Right arm, Patient Position: Sitting)   Pulse 92   Ht 5' 11" (1.803 m)   Wt 129.5 kg (285 lb 6.2 oz)   SpO2 96%   BMI 39.80 kg/m²   Physical Exam  Constitutional:       General: He is not in acute distress.     Appearance: He is not ill-appearing, toxic-appearing or diaphoretic.   Skin:     General: Skin is warm and dry.   Neurological:      General: No focal deficit present.      Mental Status: He is alert. Mental status is at baseline.   Psychiatric:         Mood and Affect: Mood normal.         Behavior: Behavior normal.         Assessment:       1. Persistent depressive disorder    2. Smoking trying to quit    3. Primary hypertension    4. Mild CAD    5. LOLA (obstructive sleep apnea)    6. Abnormal urinalysis    7. Chronic pain of right knee    8. Screening for diabetes mellitus    9. Prediabetes    10. Neck mass        Plan:       Persistent depressive disorder  - Continue wellbutrin.  - Monitor for signs of bandar, thoughts of self harm.  - Referred to psychology.    Smoking trying to quit  - Cessation strongly encouraged. Referral shows unable to contact.    Primary hypertension  - Uncontrolled. Starting back medications slowly (losartan 50, lisinopril 40, toprol xl 25, spironolactone 25).  - Return in two weeks for BP check with the nurse - bring home log and cuff.    Mild CAD  -     rosuvastatin (CRESTOR) 40 MG Tab; Take 1 tablet (40 mg total) by mouth every evening.  Dispense: 90 tablet; Refill: 3  -     Lipid Panel; Future; Expected date: 06/03/2025  - Restart statin. Discuss aspirin.    LOLA (obstructive sleep apnea)  -     Home Sleep Study; Future  - Needs updated " study and new CPAP.    Abnormal urinalysis  -     Cancel: Urinalysis, Reflex to Urine Culture Urine, Clean Catch  -     Urinalysis, Reflex to Urine Culture Urine, Clean Catch; Future; Expected date: 06/03/2025  - Remote UA with RBC. Recheck.    Chronic pain of right knee  -     Ambulatory referral/consult to Orthopedics; Future; Expected date: 06/10/2025  - Patient requests referral for chronic pain of knee and needing replacement in the past.    Screening for diabetes mellitus  -     Hemoglobin A1C; Future; Expected date: 06/03/2025  - Previously in prediabetes range. Update level.    Prediabetes  - Healthy diet and exercise.    Neck mass  - I recommend stop by upstairs at ENT and ask to reschedule since you missed your appointment with Dr. Lopez.      Return in two weeks for BP check with the nurse - bring home log and cuff.  Return in one month for doctor's visit or sooner if needed.    Visit today included increased complexity associated with the care of the episodic problem abnormal urinalysis addressed and managing the longitudinal care of the patient due to the serious and/or complex managed problem(s) primary hypertension.           [1]   Social History  Socioeconomic History    Marital status:    Tobacco Use    Smoking status: Some Days     Types: Cigarettes, Vaping with nicotine    Smokeless tobacco: Never    Tobacco comments:     Very seldom   Substance and Sexual Activity    Alcohol use: Yes     Comment: seldom    Drug use: Never     Social Drivers of Health     Financial Resource Strain: Low Risk  (3/10/2023)    Received from [facility]    Overall Financial Resource Strain (CARDIA)     Difficulty of Paying Living Expenses: Not hard at all   Food Insecurity: Patient Declined (3/10/2023)    Received from [facility] and [facility]    Hunger Vital Sign     Worried  About Running Out of Food in the Last Year: Patient declined     Ran Out of Food in the Last Year: Patient declined   Transportation Needs: No Transportation Needs (3/10/2023)    [facility]    PRALORELEIE - Transportation     Lack of Transportation (Medical): No     Lack of Transportation (Non-Medical): No   Stress: Stress Concern Present (3/10/2023)    [facility]    [employer] - Occupational Stress Questionnaire     Feeling of Stress : Very much   Housing Stability: Unknown (3/10/2023)    [facility]    Housing Stability Vital Sign     Unable to Pay for Housing in the Last Year: No   [2]   Current Outpatient Medications on File Prior to Visit   Medication Sig Dispense Refill    allopurinoL (ZYLOPRIM) 300 MG tablet Take 1 tablet (300 mg total) by mouth once daily. 90 tablet 3    aspirin (ECOTRIN) 81 MG EC tablet Take 81 mg by mouth once daily.      buPROPion (WELLBUTRIN XL) 150 MG TB24 tablet Take 1 tablet (150 mg total) by mouth once daily. 30 tablet 11    indomethacin (INDOCIN) 50 MG capsule Take 1 capsule (50 mg total) by mouth 3 (three) times daily as needed (acute gout pain). 30 capsule 1    losartan (COZAAR) 50 MG tablet Take 1 tablet (50 mg total) by mouth 2 (two) times a day. 180 tablet 1    ergocalciferol (ERGOCALCIFEROL) 50,000 unit Cap Take 1 capsule (50,000 Units total) by mouth every 7 days. (Patient not taking: Reported on 6/3/2025) 12 capsule 3    FLUoxetine 40 MG capsule Take 1 capsule (40 mg total) by mouth once daily. (Patient not taking: Reported on 6/3/2025) 90 capsule 3    lisinopriL (PRINIVIL,ZESTRIL) 40 MG tablet Take 1 tablet (40 mg total) by mouth once daily. (Patient not taking: Reported on 6/3/2025) 90  tablet 3    metFORMIN (GLUCOPHAGE-XR) 500 MG ER 24hr tablet Take 1 tablet (500 mg total) by mouth daily with breakfast. (Patient not taking: Reported on 5/20/2025) 90 tablet 3    metoprolol succinate (TOPROL-XL) 25 MG 24 hr tablet Take 1 tablet (25 mg total) by mouth once daily. (Patient not taking: Reported on 6/3/2025) 90 tablet 3    spironolactone (ALDACTONE) 25 MG tablet Take 0.5 tablets (12.5 mg total) by mouth once daily. (Patient not taking: Reported on 6/3/2025) 30 tablet 2    testosterone cypionate (DEPOTESTOTERONE CYPIONATE) 200 mg/mL injection INJECT 1 ML (200 MG TOTAL) INTO THE MUSCLE EVERY 14 DAYS (Patient not taking: Reported on 6/3/2025) 5 mL 0     No current facility-administered medications on file prior to visit.

## 2025-06-11 ENCOUNTER — TELEPHONE (OUTPATIENT)
Dept: FAMILY MEDICINE | Facility: CLINIC | Age: 55
End: 2025-06-11
Payer: MEDICARE

## 2025-06-11 NOTE — PROGRESS NOTES
Please make sure he gets my results message and schedule fasting labs for morning already scheduled nurse visit. Thank you.

## 2025-06-11 NOTE — TELEPHONE ENCOUNTER
----- Message from Kirstie Soriano DO sent at 6/10/2025  9:10 PM CDT -----  Please make sure he gets my results message and schedule fasting labs for morning already scheduled nurse visit. Thank you.  ----- Message -----  From: Lab, Background User  Sent: 6/3/2025  12:16 PM CDT  To: Kirstie Soriano DO

## 2025-06-11 NOTE — TELEPHONE ENCOUNTER
Lt VM- Informing Pt per Dr. Bo Chin message.    Your hemoglobin A1c is in the prediabetes range. I recommend referral to Urology for persistent red blood cells in your urine and history of low testosterone. Let me know if you agree, and I will place orders. Follow up as scheduled.

## 2025-06-12 ENCOUNTER — TELEPHONE (OUTPATIENT)
Dept: FAMILY MEDICINE | Facility: CLINIC | Age: 55
End: 2025-06-12
Payer: MEDICARE

## 2025-06-12 NOTE — TELEPHONE ENCOUNTER
Copied from CRM #[id number]. Topic: General Inquiry - Return Call  >> Jun 11, 2025 12:46 PM Margarito Escobar wrote:  Type:  Patient Returning Call    Who Called:pt    Who Left Message for Patient:Tata WOGNMaura    Does the patient know what this is regarding?:Yes    Would the patient rather a call back or a response via [facility]? Call back    Best Call Back Number:[phone]    Additional Information: Pt returning call.

## 2025-06-13 ENCOUNTER — TELEPHONE (OUTPATIENT)
Dept: FAMILY MEDICINE | Facility: CLINIC | Age: 55
End: 2025-06-13
Payer: MEDICARE

## 2025-06-13 NOTE — TELEPHONE ENCOUNTER
Copied from CRM #[id number]. Topic: General Inquiry - Return Call  >> Jun 13, 2025  9:25 AM Katie wrote:  Type:  Patient Returning Call    Who Called:pt  Who Left Message for Patient:Elijah Davis  Does the patient know what this is regarding?:blood work  Would the patient rather a call back or a response via [facility]? call  Best Call Back Number:[phone] or [phone]  Additional Information: Please try both numbers. Thank you

## 2025-06-17 ENCOUNTER — CLINICAL SUPPORT (OUTPATIENT)
Dept: FAMILY MEDICINE | Facility: CLINIC | Age: 55
End: 2025-06-17
Payer: MEDICARE

## 2025-06-17 ENCOUNTER — PATIENT MESSAGE (OUTPATIENT)
Dept: FAMILY MEDICINE | Facility: CLINIC | Age: 55
End: 2025-06-17

## 2025-06-17 ENCOUNTER — LAB VISIT (OUTPATIENT)
Dept: LAB | Facility: HOSPITAL | Age: 55
End: 2025-06-17
Attending: STUDENT IN AN ORGANIZED HEALTH CARE EDUCATION/TRAINING PROGRAM
Payer: MEDICARE

## 2025-06-17 VITALS
RESPIRATION RATE: 19 BRPM | HEART RATE: 80 BPM | DIASTOLIC BLOOD PRESSURE: 92 MMHG | OXYGEN SATURATION: 96 % | SYSTOLIC BLOOD PRESSURE: 150 MMHG

## 2025-06-17 DIAGNOSIS — M1A.49X0 OTHER SECONDARY CHRONIC GOUT OF MULTIPLE SITES WITHOUT TOPHUS: ICD-10-CM

## 2025-06-17 DIAGNOSIS — Z01.30 BLOOD PRESSURE CHECK: Primary | ICD-10-CM

## 2025-06-17 DIAGNOSIS — I10 PRIMARY HYPERTENSION: ICD-10-CM

## 2025-06-17 DIAGNOSIS — E55.9 VITAMIN D DEFICIENCY: ICD-10-CM

## 2025-06-17 LAB
25(OH)D3+25(OH)D2 SERPL-MCNC: 28 NG/ML (ref 30–96)
ABSOLUTE EOSINOPHIL (OHS): 0.47 K/UL
ABSOLUTE MONOCYTE (OHS): 0.67 K/UL (ref 0.3–1)
ABSOLUTE NEUTROPHIL COUNT (OHS): 6.77 K/UL (ref 1.8–7.7)
ALBUMIN SERPL BCP-MCNC: 4.2 G/DL (ref 3.5–5.2)
ALP SERPL-CCNC: 92 UNIT/L (ref 40–150)
ALT SERPL W/O P-5'-P-CCNC: 26 UNIT/L (ref 10–44)
ANION GAP (OHS): 10 MMOL/L (ref 8–16)
AST SERPL-CCNC: 18 UNIT/L (ref 11–45)
BASOPHILS # BLD AUTO: 0.09 K/UL
BASOPHILS NFR BLD AUTO: 0.8 %
BILIRUB SERPL-MCNC: 0.7 MG/DL (ref 0.1–1)
BUN SERPL-MCNC: 17 MG/DL (ref 6–20)
CALCIUM SERPL-MCNC: 9.2 MG/DL (ref 8.7–10.5)
CHLORIDE SERPL-SCNC: 100 MMOL/L (ref 95–110)
CO2 SERPL-SCNC: 27 MMOL/L (ref 23–29)
CREAT SERPL-MCNC: 1.2 MG/DL (ref 0.5–1.4)
ERYTHROCYTE [DISTWIDTH] IN BLOOD BY AUTOMATED COUNT: 13.6 % (ref 11.5–14.5)
GFR SERPLBLD CREATININE-BSD FMLA CKD-EPI: >60 ML/MIN/1.73/M2
GLUCOSE SERPL-MCNC: 125 MG/DL (ref 70–110)
HCT VFR BLD AUTO: 48.2 % (ref 40–54)
HGB BLD-MCNC: 15.6 GM/DL (ref 14–18)
IMM GRANULOCYTES # BLD AUTO: 0.04 K/UL (ref 0–0.04)
IMM GRANULOCYTES NFR BLD AUTO: 0.4 % (ref 0–0.5)
LYMPHOCYTES # BLD AUTO: 3.38 K/UL (ref 1–4.8)
MCH RBC QN AUTO: 30 PG (ref 27–31)
MCHC RBC AUTO-ENTMCNC: 32.4 G/DL (ref 32–36)
MCV RBC AUTO: 93 FL (ref 82–98)
NUCLEATED RBC (/100WBC) (OHS): 0 /100 WBC
PLATELET # BLD AUTO: 296 K/UL (ref 150–450)
PMV BLD AUTO: 9.6 FL (ref 9.2–12.9)
POTASSIUM SERPL-SCNC: 4.6 MMOL/L (ref 3.5–5.1)
PROT SERPL-MCNC: 7.5 GM/DL (ref 6–8.4)
RBC # BLD AUTO: 5.2 M/UL (ref 4.6–6.2)
RELATIVE EOSINOPHIL (OHS): 4.1 %
RELATIVE LYMPHOCYTE (OHS): 29.6 % (ref 18–48)
RELATIVE MONOCYTE (OHS): 5.9 % (ref 4–15)
RELATIVE NEUTROPHIL (OHS): 59.2 % (ref 38–73)
SODIUM SERPL-SCNC: 137 MMOL/L (ref 136–145)
URATE SERPL-MCNC: 10.2 MG/DL (ref 3.4–7)
WBC # BLD AUTO: 11.42 K/UL (ref 3.9–12.7)

## 2025-06-17 PROCEDURE — 36415 COLL VENOUS BLD VENIPUNCTURE: CPT | Mod: PO

## 2025-06-17 PROCEDURE — 84550 ASSAY OF BLOOD/URIC ACID: CPT

## 2025-06-17 PROCEDURE — 80053 COMPREHEN METABOLIC PANEL: CPT

## 2025-06-17 PROCEDURE — 99212 OFFICE O/P EST SF 10 MIN: CPT | Mod: PBBFAC,PO

## 2025-06-17 PROCEDURE — 99999 PR PBB SHADOW E&M-EST. PATIENT-LVL II: CPT | Mod: PBBFAC,,,

## 2025-06-17 PROCEDURE — 85025 COMPLETE CBC W/AUTO DIFF WBC: CPT

## 2025-06-17 PROCEDURE — 82306 VITAMIN D 25 HYDROXY: CPT

## 2025-06-17 PROCEDURE — 99211 OFF/OP EST MAY X REQ PHY/QHP: CPT | Mod: S$PBB,,, | Performed by: STUDENT IN AN ORGANIZED HEALTH CARE EDUCATION/TRAINING PROGRAM

## 2025-06-17 NOTE — Clinical Note
Pt. Seen in clinic for bp check.  His initial reading was 146/92 and second reading was 150/92.  Please see clinical support note for details.

## 2025-06-17 NOTE — PROGRESS NOTES
Bryant Corona 54 y.o. male is here today for Blood Pressure check.   History of HTN yes.    Review of patient's allergies indicates:   Allergen Reactions    Codeine Itching     Creatinine   Date Value Ref Range Status   09/06/2024 1.3 0.5 - 1.4 mg/dL Final     Sodium   Date Value Ref Range Status   09/06/2024 140 136 - 145 mmol/L Final     Potassium   Date Value Ref Range Status   09/06/2024 4.6 3.5 - 5.1 mmol/L Final   ]  Patient verifies taking blood pressure medications on a regular basis at the same time of the day.   Current Medications[1]  Does patient have record of home blood pressure readings no. Readings have been averaging n/a.  Pt. Denies having home bp monitor.    Last dose of blood pressure medication was taken at 11:30 AM on 6/16/25.   Pt. Reports missing today's dose of bp medication.  Patient is asymptomatic.   Complains of n/a.    BP: (!) 146/92 , Pulse: 80 .    Blood pressure reading after 15 minutes was 150/92, Pulse 84.  Dr. Soriano notified.           [1]   Current Outpatient Medications:     allopurinoL (ZYLOPRIM) 300 MG tablet, Take 1 tablet (300 mg total) by mouth once daily., Disp: 90 tablet, Rfl: 3    aspirin (ECOTRIN) 81 MG EC tablet, Take 81 mg by mouth once daily., Disp: , Rfl:     buPROPion (WELLBUTRIN XL) 150 MG TB24 tablet, Take 1 tablet (150 mg total) by mouth once daily., Disp: 30 tablet, Rfl: 11    ergocalciferol (ERGOCALCIFEROL) 50,000 unit Cap, Take 1 capsule (50,000 Units total) by mouth every 7 days. (Patient not taking: Reported on 6/3/2025), Disp: 12 capsule, Rfl: 3    FLUoxetine 40 MG capsule, Take 1 capsule (40 mg total) by mouth once daily. (Patient not taking: Reported on 6/3/2025), Disp: 90 capsule, Rfl: 3    indomethacin (INDOCIN) 50 MG capsule, Take 1 capsule (50 mg total) by mouth 3 (three) times daily as needed (acute gout pain)., Disp: 30 capsule, Rfl: 1    lisinopriL (PRINIVIL,ZESTRIL) 40 MG tablet, Take 1 tablet (40 mg total) by mouth once daily. (Patient  not taking: Reported on 6/3/2025), Disp: 90 tablet, Rfl: 3    losartan (COZAAR) 50 MG tablet, Take 1 tablet (50 mg total) by mouth 2 (two) times a day., Disp: 180 tablet, Rfl: 1    metFORMIN (GLUCOPHAGE-XR) 500 MG ER 24hr tablet, Take 1 tablet (500 mg total) by mouth daily with breakfast. (Patient not taking: Reported on 5/20/2025), Disp: 90 tablet, Rfl: 3    metoprolol succinate (TOPROL-XL) 25 MG 24 hr tablet, Take 1 tablet (25 mg total) by mouth once daily. (Patient not taking: Reported on 6/3/2025), Disp: 90 tablet, Rfl: 3    rosuvastatin (CRESTOR) 40 MG Tab, Take 1 tablet (40 mg total) by mouth every evening., Disp: 90 tablet, Rfl: 3    spironolactone (ALDACTONE) 25 MG tablet, Take 0.5 tablets (12.5 mg total) by mouth once daily. (Patient not taking: Reported on 6/3/2025), Disp: 30 tablet, Rfl: 2    testosterone cypionate (DEPOTESTOTERONE CYPIONATE) 200 mg/mL injection, INJECT 1 ML (200 MG TOTAL) INTO THE MUSCLE EVERY 14 DAYS (Patient not taking: Reported on 6/3/2025), Disp: 5 mL, Rfl: 0

## 2025-06-22 ENCOUNTER — RESULTS FOLLOW-UP (OUTPATIENT)
Dept: FAMILY MEDICINE | Facility: CLINIC | Age: 55
End: 2025-06-22

## 2025-06-25 ENCOUNTER — PATIENT MESSAGE (OUTPATIENT)
Dept: FAMILY MEDICINE | Facility: CLINIC | Age: 55
End: 2025-06-25
Payer: MEDICARE

## 2025-07-03 ENCOUNTER — PATIENT MESSAGE (OUTPATIENT)
Dept: FAMILY MEDICINE | Facility: CLINIC | Age: 55
End: 2025-07-03
Payer: MEDICARE

## 2025-07-04 NOTE — TELEPHONE ENCOUNTER
Sent a chart message informing patient of scheduled appointment due to shoulder pain.  Dr. Soriano is out of the office and would like patient to see a provider at the office.

## 2025-07-10 ENCOUNTER — PATIENT MESSAGE (OUTPATIENT)
Dept: ADMINISTRATIVE | Facility: HOSPITAL | Age: 55
End: 2025-07-10
Payer: MEDICARE

## 2025-07-10 ENCOUNTER — OFFICE VISIT (OUTPATIENT)
Dept: UROLOGY | Facility: CLINIC | Age: 55
End: 2025-07-10
Payer: MEDICARE

## 2025-07-10 VITALS — HEIGHT: 71 IN | BODY MASS INDEX: 39.73 KG/M2 | WEIGHT: 283.75 LBS

## 2025-07-10 DIAGNOSIS — R31.29 MICROSCOPIC HEMATURIA: ICD-10-CM

## 2025-07-10 DIAGNOSIS — Z87.442 HISTORY OF KIDNEY STONES: Primary | ICD-10-CM

## 2025-07-10 LAB
BACTERIA #/AREA URNS HPF: ABNORMAL /HPF
HYALINE CASTS #/AREA URNS LPF: 3 /LPF (ref 0–1)
MICROSCOPIC COMMENT: ABNORMAL
RBC #/AREA URNS HPF: 1 /HPF (ref 0–4)
SQUAMOUS #/AREA URNS HPF: 3 /HPF
WBC #/AREA URNS HPF: 3 /HPF (ref 0–5)
WBC CLUMPS URNS QL MICRO: ABNORMAL

## 2025-07-10 PROCEDURE — 99214 OFFICE O/P EST MOD 30 MIN: CPT | Mod: PBBFAC,PO

## 2025-07-10 PROCEDURE — 99999 PR PBB SHADOW E&M-EST. PATIENT-LVL IV: CPT | Mod: PBBFAC,,,

## 2025-07-10 PROCEDURE — 81000 URINALYSIS NONAUTO W/SCOPE: CPT | Mod: PO

## 2025-07-10 RX ORDER — HYDROCODONE BITARTRATE AND ACETAMINOPHEN 7.5; 325 MG/1; MG/1
1 TABLET ORAL 2 TIMES DAILY PRN
COMMUNITY
Start: 2025-07-01

## 2025-07-10 NOTE — PROGRESS NOTES
[facility] Note  Staff: ARAM Belle    PCP: DO Bo    Chief Complaint: microscopic hematuria    Subjective:        HPI: Bryant Corona is a 54 y.o. male NEW PATIENT presents today for evaluation of microscopic hematuria. His most recent micro UA is from 6/3/2025 which showed 3 RBCs per HPF. We discussed this as normal and not requiring further evaluation at this time. He denies seeing blood in his urine. He states he does have a history of kidney stones which we discussed could be a RF for microscopic hematuria. He has not had any recent imaging and he agrees. He denies dysuria, hematuria, fever, flank pain, and difficulty urinating.     Questions asked the pt during ov today:  Urgency: no, urge incontinence? no  Dysuria: No  Gross Hematuria:No  Straining:No, Hesistancy:No, Intermittency:No}, Weak stream:No    Last PSA Screening:   Lab Results   Component Value Date    PSA 2.5 03/01/2023       History of Kidney Stones?:  yes    Constipation issues?:  no    REVIEW OF SYSTEMS:  Review of Systems   Constitutional: Negative.  Negative for chills and fever.   Gastrointestinal: Negative.  Negative for abdominal pain, constipation, diarrhea, nausea and vomiting.   Genitourinary: Negative.  Negative for dysuria, flank pain, frequency, hematuria and urgency.   Musculoskeletal: Negative.  Negative for back pain.       PMHx:  Past Medical History:   Diagnosis Date    Anxiety     Back pain     Depression     Gout, unspecified     Hyperlipidemia     Hypertension     Sleep apnea     uses cpap       PSHx:  Past Surgical History:   Procedure Laterality Date    ANGIOGRAM, CORONARY, WITH LEFT HEART CATHETERIZATION  12/5/2023    Procedure: Left Heart Cath;  Surgeon: Ced Teran MD;  Location: [facility] CATH;  Service: Cardiology;;    CORONARY ANGIOGRAPHY  12/5/2023    Procedure: Coronary angiogram study;  Surgeon: Ced Teran MD;  Location: [facility] CATH;  Service: Cardiology;;    left knee  surgery      TIBIA FRACTURE SURGERY Right        Fam Hx:   malignancies: No    kidney stones: No     Soc Hx:  , lives in [place]    Allergies:  Codeine    Medications: reviewed     Objective:   There were no vitals filed for this visit.    Physical Exam  Constitutional:       Appearance: Normal appearance.   Pulmonary:      Effort: Pulmonary effort is normal.   Abdominal:      General: There is no distension.      Palpations: Abdomen is soft.      Tenderness: There is no abdominal tenderness.   Musculoskeletal:         General: Normal range of motion.      Cervical back: Normal range of motion.   Skin:     General: Skin is warm.   Neurological:      Mental Status: He is oriented to person, place, and time.   Psychiatric:         Mood and Affect: Mood normal.         Behavior: Behavior normal.           LABS REVIEW:  UA today:  Color:Clear, Yellow  Spec. Grav.  1.020  PH  6.0  Negative for leukocytes, nitrates, protein, glucose, ketones, urobili, bili  Small blood    Assessment:       1. History of kidney stones    2. Microscopic hematuria          Plan:     Urine sent for micro UA  CT RSS ordered and scheduled    F/u per plan    [facility]: active    ARAM Belle

## 2025-07-11 ENCOUNTER — TELEPHONE (OUTPATIENT)
Dept: UROLOGY | Facility: CLINIC | Age: 55
End: 2025-07-11
Payer: MEDICARE

## 2025-07-11 DIAGNOSIS — Z12.11 SCREENING FOR COLON CANCER: ICD-10-CM

## 2025-07-11 NOTE — TELEPHONE ENCOUNTER
Call pt and lvm. QR----- Message from Melissa Zayas NP sent at 7/11/2025  7:59 AM CDT -----  Only 1 red blood cell seen which is normal and requires no further evaluation. Waiting for CT to assess for stones  ----- Message -----  From: Lab, Background User  Sent: 7/10/2025   4:56 PM CDT  To: Melissa Zayas NP

## 2025-07-17 ENCOUNTER — PATIENT MESSAGE (OUTPATIENT)
Dept: FAMILY MEDICINE | Facility: CLINIC | Age: 55
End: 2025-07-17
Payer: MEDICARE

## 2025-07-17 DIAGNOSIS — G47.30 SEVERE SLEEP APNEA: Primary | ICD-10-CM

## 2025-07-25 ENCOUNTER — PATIENT MESSAGE (OUTPATIENT)
Dept: FAMILY MEDICINE | Facility: CLINIC | Age: 55
End: 2025-07-25
Payer: MEDICARE

## 2025-08-04 ENCOUNTER — PATIENT MESSAGE (OUTPATIENT)
Dept: ADMINISTRATIVE | Facility: HOSPITAL | Age: 55
End: 2025-08-04
Payer: MEDICARE

## 2025-08-21 ENCOUNTER — PATIENT MESSAGE (OUTPATIENT)
Dept: PSYCHIATRY | Facility: CLINIC | Age: 55
End: 2025-08-21
Payer: MEDICARE

## 2025-08-21 ENCOUNTER — PATIENT MESSAGE (OUTPATIENT)
Dept: ADMINISTRATIVE | Facility: HOSPITAL | Age: 55
End: 2025-08-21
Payer: MEDICARE

## 2025-08-25 ENCOUNTER — TELEPHONE (OUTPATIENT)
Dept: FAMILY MEDICINE | Facility: CLINIC | Age: 55
End: 2025-08-25
Payer: MEDICARE